# Patient Record
Sex: FEMALE | Race: WHITE
[De-identification: names, ages, dates, MRNs, and addresses within clinical notes are randomized per-mention and may not be internally consistent; named-entity substitution may affect disease eponyms.]

---

## 2017-10-26 ENCOUNTER — HOSPITAL ENCOUNTER (EMERGENCY)
Dept: HOSPITAL 1 - ED | Age: 22
Discharge: HOME | End: 2017-10-26
Payer: COMMERCIAL

## 2017-10-26 VITALS — BODY MASS INDEX: 32.14 KG/M2 | WEIGHT: 181.37 LBS | HEIGHT: 63 IN

## 2017-10-26 VITALS — SYSTOLIC BLOOD PRESSURE: 121 MMHG | DIASTOLIC BLOOD PRESSURE: 62 MMHG

## 2017-10-26 DIAGNOSIS — J20.9: Primary | ICD-10-CM

## 2017-10-27 ENCOUNTER — HOSPITAL ENCOUNTER (EMERGENCY)
Dept: HOSPITAL 1 - ED | Age: 22
Discharge: HOME | End: 2017-10-27
Payer: COMMERCIAL

## 2017-10-27 VITALS — SYSTOLIC BLOOD PRESSURE: 116 MMHG | DIASTOLIC BLOOD PRESSURE: 78 MMHG

## 2017-10-27 DIAGNOSIS — O21.0: Primary | ICD-10-CM

## 2017-10-27 DIAGNOSIS — Z3A.01: ICD-10-CM

## 2017-10-27 LAB
ALBUMIN SERPL-MCNC: 4.3 G/DL (ref 3.4–5)
ALP SERPL-CCNC: 84 U/L (ref 46–116)
ALT SERPL-CCNC: 21 U/L (ref 14–59)
AST SERPL-CCNC: 11 U/L (ref 15–37)
BASOPHILS NFR BLD: 0.7 % (ref 0–2)
BILIRUB SERPL-MCNC: 0.6 MG/DL (ref 0.2–1)
BUN SERPL-MCNC: 12 MG/DL (ref 7–18)
CALCIUM SERPL-MCNC: 9.3 MG/DL (ref 8.5–10.1)
CHLORIDE SERPL-SCNC: 103 MMOL/L (ref 98–107)
CO2 SERPL-SCNC: 23.6 MMOL/L (ref 21–32)
CREAT SERPL-MCNC: 0.8 MG/DL (ref 0.6–1)
ERYTHROCYTE [DISTWIDTH] IN BLOOD BY AUTOMATED COUNT: 13.4 % (ref 11.5–14.5)
GFR SERPLBLD BASED ON 1.73 SQ M-ARVRAT: > 60 ML/MIN
GLUCOSE SERPL-MCNC: 101 MG/DL (ref 74–106)
PLATELET # BLD: 264 X10^3MCL (ref 130–400)
POTASSIUM SERPL-SCNC: 3.2 MMOL/L (ref 3.5–5.1)
PROT SERPL-MCNC: 8.5 G/DL (ref 6.4–8.2)
SODIUM SERPL-SCNC: 139 MMOL/L (ref 136–145)

## 2017-10-30 ENCOUNTER — HOSPITAL ENCOUNTER (INPATIENT)
Dept: HOSPITAL 1 - ED | Age: 22
LOS: 3 days | Discharge: HOME | DRG: 781 | End: 2017-11-02
Attending: FAMILY MEDICINE | Admitting: FAMILY MEDICINE
Payer: COMMERCIAL

## 2017-10-30 VITALS — SYSTOLIC BLOOD PRESSURE: 112 MMHG | DIASTOLIC BLOOD PRESSURE: 63 MMHG

## 2017-10-30 VITALS
HEIGHT: 63 IN | BODY MASS INDEX: 31.28 KG/M2 | WEIGHT: 176.55 LBS | BODY MASS INDEX: 31.28 KG/M2 | HEIGHT: 63 IN | WEIGHT: 176.55 LBS

## 2017-10-30 VITALS — DIASTOLIC BLOOD PRESSURE: 71 MMHG | SYSTOLIC BLOOD PRESSURE: 119 MMHG

## 2017-10-30 DIAGNOSIS — E87.6: ICD-10-CM

## 2017-10-30 DIAGNOSIS — O99.281: ICD-10-CM

## 2017-10-30 DIAGNOSIS — Z3A.01: ICD-10-CM

## 2017-10-30 DIAGNOSIS — N17.0: ICD-10-CM

## 2017-10-30 DIAGNOSIS — F12.10: ICD-10-CM

## 2017-10-30 DIAGNOSIS — E86.0: ICD-10-CM

## 2017-10-30 DIAGNOSIS — O21.1: Primary | ICD-10-CM

## 2017-10-30 DIAGNOSIS — O99.211: ICD-10-CM

## 2017-10-30 DIAGNOSIS — E66.9: ICD-10-CM

## 2017-10-30 DIAGNOSIS — O26.831: ICD-10-CM

## 2017-10-30 LAB
ALBUMIN SERPL-MCNC: 4.5 G/DL (ref 3.4–5)
ALP SERPL-CCNC: 79 U/L (ref 46–116)
ALT SERPL-CCNC: 36 U/L (ref 14–59)
AMPHETAMINES UR QL SCN: (no result)
AMYLASE SERPL-CCNC: 81 U/L (ref 25–115)
AST SERPL-CCNC: 24 U/L (ref 15–37)
BASOPHILS NFR BLD: 1.3 % (ref 0–2)
BILIRUB SERPL-MCNC: 1.11 MG/DL (ref 0.2–1)
BUN SERPL-MCNC: 12 MG/DL (ref 7–18)
CALCIUM SERPL-MCNC: 9.6 MG/DL (ref 8.5–10.1)
CHLORIDE SERPL-SCNC: 98 MMOL/L (ref 98–107)
CHOLEST SERPL-MCNC: 177 MG/DL (ref ?–200)
CHOLEST/HDLC SERPL: 4.1 MG/DL
CO2 SERPL-SCNC: 25.4 MMOL/L (ref 21–32)
CREAT SERPL-MCNC: 0.8 MG/DL (ref 0.6–1)
ERYTHROCYTE [DISTWIDTH] IN BLOOD BY AUTOMATED COUNT: 13.3 % (ref 11.5–14.5)
GFR SERPLBLD BASED ON 1.73 SQ M-ARVRAT: > 60 ML/MIN
GLUCOSE SERPL-MCNC: 112 MG/DL (ref 74–106)
HDLC SERPL-MCNC: 43 MG/DL (ref 40–60)
LIPASE SERPL-CCNC: 152 IU/L (ref 73–393)
MAGNESIUM SERPL-MCNC: 2.3 MG/DL (ref 1.8–2.4)
MICROSCOPIC UR-IMP: YES
PHOSPHATE SERPL-MCNC: 3.4 MG/DL (ref 2.5–4.9)
PLATELET # BLD: 295 X10^3MCL (ref 130–400)
POTASSIUM SERPL-SCNC: 3.1 MMOL/L (ref 3.5–5.1)
PROT SERPL-MCNC: 8.9 G/DL (ref 6.4–8.2)
RBC # UR STRIP.AUTO: NEGATIVE /UL
SODIUM SERPL-SCNC: 136 MMOL/L (ref 136–145)
T3 SERPL-MCNC: 1.15 NG/ML
T3RU NFR SERPL: 32 % UPTAKE (ref 30–39)
T4 FREE SERPL-MCNC: 1.31 NG/DL (ref 0.76–1.46)
T4 SERPL-MCNC: 13.3 UG/DL (ref 4.7–13.3)
T4/T3 UPTAKE INDEX SERPL: 4.3 UG/DL (ref 1.4–4.5)
TRIGL SERPL-MCNC: 91 MG/DL (ref ?–150)
UA SPECIFIC GRAVITY: 1.02 (ref 1–1.03)

## 2017-10-31 VITALS — SYSTOLIC BLOOD PRESSURE: 114 MMHG | DIASTOLIC BLOOD PRESSURE: 79 MMHG

## 2017-10-31 VITALS — SYSTOLIC BLOOD PRESSURE: 104 MMHG | DIASTOLIC BLOOD PRESSURE: 68 MMHG

## 2017-10-31 VITALS — SYSTOLIC BLOOD PRESSURE: 119 MMHG | DIASTOLIC BLOOD PRESSURE: 73 MMHG

## 2017-10-31 VITALS — SYSTOLIC BLOOD PRESSURE: 103 MMHG | DIASTOLIC BLOOD PRESSURE: 58 MMHG

## 2017-10-31 VITALS — SYSTOLIC BLOOD PRESSURE: 109 MMHG | DIASTOLIC BLOOD PRESSURE: 41 MMHG

## 2017-10-31 LAB
BASOPHILS NFR BLD: 0.8 % (ref 0–2)
BUN SERPL-MCNC: 6 MG/DL (ref 7–18)
CALCIUM SERPL-MCNC: 8.5 MG/DL (ref 8.5–10.1)
CHLORIDE SERPL-SCNC: 103 MMOL/L (ref 98–107)
CO2 SERPL-SCNC: 26.4 MMOL/L (ref 21–32)
CREAT SERPL-MCNC: 0.6 MG/DL (ref 0.6–1)
ERYTHROCYTE [DISTWIDTH] IN BLOOD BY AUTOMATED COUNT: 13 % (ref 11.5–14.5)
GFR SERPLBLD BASED ON 1.73 SQ M-ARVRAT: > 60 ML/MIN
GLUCOSE SERPL-MCNC: 91 MG/DL (ref 74–106)
MAGNESIUM SERPL-MCNC: 2.2 MG/DL (ref 1.8–2.4)
PHOSPHATE SERPL-MCNC: 3.2 MG/DL (ref 2.5–4.9)
PLATELET # BLD: 234 X10^3MCL (ref 130–400)
POTASSIUM SERPL-SCNC: 3 MMOL/L (ref 3.5–5.1)
SODIUM SERPL-SCNC: 138 MMOL/L (ref 136–145)

## 2017-11-01 VITALS — SYSTOLIC BLOOD PRESSURE: 128 MMHG | DIASTOLIC BLOOD PRESSURE: 75 MMHG

## 2017-11-01 VITALS — DIASTOLIC BLOOD PRESSURE: 75 MMHG | SYSTOLIC BLOOD PRESSURE: 127 MMHG

## 2017-11-01 VITALS — SYSTOLIC BLOOD PRESSURE: 123 MMHG | DIASTOLIC BLOOD PRESSURE: 81 MMHG

## 2017-11-01 VITALS — SYSTOLIC BLOOD PRESSURE: 126 MMHG | DIASTOLIC BLOOD PRESSURE: 80 MMHG

## 2017-11-01 VITALS — SYSTOLIC BLOOD PRESSURE: 131 MMHG | DIASTOLIC BLOOD PRESSURE: 80 MMHG

## 2017-11-01 VITALS — SYSTOLIC BLOOD PRESSURE: 124 MMHG | DIASTOLIC BLOOD PRESSURE: 65 MMHG

## 2017-11-01 LAB
BASOPHILS NFR BLD: 0.3 % (ref 0–2)
BUN SERPL-MCNC: 3 MG/DL (ref 7–18)
CALCIUM SERPL-MCNC: 8.9 MG/DL (ref 8.5–10.1)
CHLORIDE SERPL-SCNC: 102 MMOL/L (ref 98–107)
CO2 SERPL-SCNC: 23.3 MMOL/L (ref 21–32)
CREAT SERPL-MCNC: 0.6 MG/DL (ref 0.6–1)
ERYTHROCYTE [DISTWIDTH] IN BLOOD BY AUTOMATED COUNT: 13.4 % (ref 11.5–14.5)
GFR SERPLBLD BASED ON 1.73 SQ M-ARVRAT: > 60 ML/MIN
GLUCOSE SERPL-MCNC: 99 MG/DL (ref 74–106)
MAGNESIUM SERPL-MCNC: 1.9 MG/DL (ref 1.8–2.4)
PHOSPHATE SERPL-MCNC: 3.2 MG/DL (ref 2.5–4.9)
PLATELET # BLD: 234 X10^3MCL (ref 130–400)
POTASSIUM SERPL-SCNC: 3.6 MMOL/L (ref 3.5–5.1)
SODIUM SERPL-SCNC: 137 MMOL/L (ref 136–145)

## 2017-11-02 VITALS — SYSTOLIC BLOOD PRESSURE: 115 MMHG | DIASTOLIC BLOOD PRESSURE: 66 MMHG

## 2017-11-02 VITALS — DIASTOLIC BLOOD PRESSURE: 76 MMHG | SYSTOLIC BLOOD PRESSURE: 115 MMHG

## 2017-11-02 VITALS — DIASTOLIC BLOOD PRESSURE: 66 MMHG | SYSTOLIC BLOOD PRESSURE: 115 MMHG

## 2017-11-02 LAB
BASOPHILS NFR BLD: 0.7 % (ref 0–2)
BUN SERPL-MCNC: 6 MG/DL (ref 7–18)
CALCIUM SERPL-MCNC: 8.8 MG/DL (ref 8.5–10.1)
CHLORIDE SERPL-SCNC: 101 MMOL/L (ref 98–107)
CO2 SERPL-SCNC: 25.9 MMOL/L (ref 21–32)
CREAT SERPL-MCNC: 0.7 MG/DL (ref 0.6–1)
ERYTHROCYTE [DISTWIDTH] IN BLOOD BY AUTOMATED COUNT: 13 % (ref 11.5–14.5)
GFR SERPLBLD BASED ON 1.73 SQ M-ARVRAT: > 60 ML/MIN
GLUCOSE SERPL-MCNC: 100 MG/DL (ref 74–106)
MAGNESIUM SERPL-MCNC: 2.1 MG/DL (ref 1.8–2.4)
PHOSPHATE SERPL-MCNC: 3.6 MG/DL (ref 2.5–4.9)
PLATELET # BLD: 233 X10^3MCL (ref 130–400)
POTASSIUM SERPL-SCNC: 3.2 MMOL/L (ref 3.5–5.1)
SODIUM SERPL-SCNC: 136 MMOL/L (ref 136–145)

## 2017-11-18 ENCOUNTER — HOSPITAL ENCOUNTER (INPATIENT)
Dept: HOSPITAL 26 - MED | Age: 22
LOS: 3 days | Discharge: HOME | DRG: 781 | End: 2017-11-21
Attending: FAMILY MEDICINE | Admitting: FAMILY MEDICINE
Payer: COMMERCIAL

## 2017-11-18 VITALS — SYSTOLIC BLOOD PRESSURE: 106 MMHG | DIASTOLIC BLOOD PRESSURE: 56 MMHG

## 2017-11-18 VITALS — BODY MASS INDEX: 27.16 KG/M2 | WEIGHT: 169 LBS | HEIGHT: 66 IN

## 2017-11-18 DIAGNOSIS — O26.832: ICD-10-CM

## 2017-11-18 DIAGNOSIS — O99.612: ICD-10-CM

## 2017-11-18 DIAGNOSIS — R79.89: ICD-10-CM

## 2017-11-18 DIAGNOSIS — K81.0: ICD-10-CM

## 2017-11-18 DIAGNOSIS — N17.0: ICD-10-CM

## 2017-11-18 DIAGNOSIS — Z3A.09: ICD-10-CM

## 2017-11-18 DIAGNOSIS — E86.0: ICD-10-CM

## 2017-11-18 DIAGNOSIS — R74.0: ICD-10-CM

## 2017-11-18 DIAGNOSIS — O26.892: ICD-10-CM

## 2017-11-18 DIAGNOSIS — O21.1: Primary | ICD-10-CM

## 2017-11-18 DIAGNOSIS — Z79.899: ICD-10-CM

## 2017-11-18 NOTE — NUR
22Y/F PT. PRESENTS TO ED WITH C/O GENERALIZED WEAKNESS X 1 WK. PT. IUP 9 WKS, 
N/V. NO MEDICAL HX, AAO X4, AMBULATORY WITH STEADY GAIT, GCS 15. RESPIRATIONS 
ROOM AIR, EVEN AND UNLABORED. ABDOMEN SOFT, NON TENDER, PT. GENERALIZED 
WEAKNESS. VSS, ER MD MADE AWARE OFPT. STATUS.

## 2017-11-19 VITALS — SYSTOLIC BLOOD PRESSURE: 119 MMHG | DIASTOLIC BLOOD PRESSURE: 55 MMHG

## 2017-11-19 VITALS — SYSTOLIC BLOOD PRESSURE: 133 MMHG | DIASTOLIC BLOOD PRESSURE: 73 MMHG

## 2017-11-19 VITALS — SYSTOLIC BLOOD PRESSURE: 139 MMHG | DIASTOLIC BLOOD PRESSURE: 98 MMHG

## 2017-11-19 VITALS — DIASTOLIC BLOOD PRESSURE: 69 MMHG | SYSTOLIC BLOOD PRESSURE: 124 MMHG

## 2017-11-19 LAB
ALBUMIN FLD-MCNC: 3.4 G/DL (ref 3.4–5)
AMYLASE SERPL-CCNC: 222 U/L (ref 25–115)
ANION GAP SERPL CALCULATED.3IONS-SCNC: 6.9 MMOL/L (ref 8–16)
ANION GAP SERPL CALCULATED.3IONS-SCNC: 7.6 MMOL/L (ref 8–16)
APPEARANCE UR: (no result)
AST SERPL-CCNC: 65 U/L (ref 15–37)
BASOPHILS # BLD AUTO: 0.3 K/UL (ref 0–0.22)
BASOPHILS # BLD AUTO: 0.3 K/UL (ref 0–0.22)
BASOPHILS NFR BLD AUTO: 3.3 % (ref 0–2)
BASOPHILS NFR BLD AUTO: 3.6 % (ref 0–2)
BILIRUB SERPL-MCNC: 1.2 MG/DL (ref 0–1)
BILIRUB UR QL STRIP: NEGATIVE
BUN SERPL-MCNC: 49 MG/DL (ref 7–18)
BUN SERPL-MCNC: 61 MG/DL (ref 7–18)
CHLORIDE SERPL-SCNC: 77 MMOL/L (ref 98–107)
CHLORIDE SERPL-SCNC: 86 MMOL/L (ref 98–107)
CHOLEST/HDLC SERPL: 6.8 {RATIO} (ref 1–4.5)
CO2 SERPL-SCNC: 39.2 MMOL/L (ref 21–32)
CO2 SERPL-SCNC: 42.5 MMOL/L (ref 21–32)
COLOR UR: YELLOW
CREAT SERPL-MCNC: 1.6 MG/DL (ref 0.6–1.3)
CREAT SERPL-MCNC: 1.9 MG/DL (ref 0.6–1.3)
EOSINOPHIL # BLD AUTO: 0.1 K/UL (ref 0–0.4)
EOSINOPHIL # BLD AUTO: 0.1 K/UL (ref 0–0.4)
EOSINOPHIL NFR BLD AUTO: 0.6 % (ref 0–4)
EOSINOPHIL NFR BLD AUTO: 1.6 % (ref 0–4)
ERYTHROCYTE [DISTWIDTH] IN BLOOD BY AUTOMATED COUNT: 12 % (ref 11.6–13.7)
ERYTHROCYTE [DISTWIDTH] IN BLOOD BY AUTOMATED COUNT: 12.3 % (ref 11.6–13.7)
GFR SERPL CREATININE-BSD FRML MDRD: 43 ML/MIN (ref 90–?)
GFR SERPL CREATININE-BSD FRML MDRD: 52 ML/MIN (ref 90–?)
GLUCOSE SERPL-MCNC: 111 MG/DL (ref 74–106)
GLUCOSE SERPL-MCNC: 117 MG/DL (ref 74–106)
GLUCOSE UR STRIP-MCNC: (no result) MG/DL
HCT VFR BLD AUTO: 34.5 % (ref 36–48)
HCT VFR BLD AUTO: 42 % (ref 36–48)
HDLC SERPL-MCNC: 32 MG/DL (ref 40–60)
HGB BLD-MCNC: 11.4 G/DL (ref 12–16)
HGB BLD-MCNC: 14.1 G/DL (ref 12–16)
HGB UR QL STRIP: NEGATIVE
LDLC SERPL CALC-MCNC: 126 MG/DL (ref 60–100)
LEUKOCYTE ESTERASE UR QL STRIP: (no result)
LIPASE SERPL-CCNC: 395 U/L (ref 73–393)
LYMPHOCYTES # BLD AUTO: 1.9 K/UL (ref 2.5–16.5)
LYMPHOCYTES # BLD AUTO: 2 K/UL (ref 2.5–16.5)
LYMPHOCYTES NFR BLD AUTO: 20.2 % (ref 20.5–51.1)
LYMPHOCYTES NFR BLD AUTO: 26.2 % (ref 20.5–51.1)
MAGNESIUM SERPL-MCNC: 2.9 MG/DL (ref 1.8–2.4)
MCH RBC QN AUTO: 28 PG (ref 27–31)
MCH RBC QN AUTO: 28 PG (ref 27–31)
MCHC RBC AUTO-ENTMCNC: 33 G/DL (ref 33–37)
MCHC RBC AUTO-ENTMCNC: 34 G/DL (ref 33–37)
MCV RBC AUTO: 84 FL (ref 80–94)
MCV RBC AUTO: 85 FL (ref 80–94)
MONOCYTES # BLD AUTO: 0.6 K/UL (ref 0.8–1)
MONOCYTES # BLD AUTO: 1 K/UL (ref 0.8–1)
MONOCYTES NFR BLD AUTO: 13 % (ref 1.7–9.3)
MONOCYTES NFR BLD AUTO: 5.8 % (ref 1.7–9.3)
NEUTROPHILS # BLD AUTO: 4.3 K/UL (ref 1.8–7.7)
NEUTROPHILS # BLD AUTO: 6.6 K/UL (ref 1.8–7.7)
NEUTROPHILS NFR BLD AUTO: 55.6 % (ref 42.2–75.2)
NEUTROPHILS NFR BLD AUTO: 70.1 % (ref 42.2–75.2)
NITRITE UR QL STRIP: NEGATIVE
PH UR STRIP: 7.5 [PH] (ref 5–9)
PHOSPHATE SERPL-MCNC: 3.7 MG/DL (ref 2.5–4.9)
PLATELET # BLD AUTO: 148 K/UL (ref 140–450)
PLATELET # BLD AUTO: 182 K/UL (ref 140–450)
POTASSIUM SERPL-SCNC: 2.1 MMOL/L (ref 3.5–5.1)
POTASSIUM SERPL-SCNC: 3.1 MMOL/L (ref 3.5–5.1)
PROTHROMBIN TIME: 11.1 SECS (ref 10.8–13.4)
RBC # BLD AUTO: 4.07 MIL/UL (ref 4.2–5.4)
RBC # BLD AUTO: 4.99 MIL/UL (ref 4.2–5.4)
RBC #/AREA URNS HPF: (no result) /HPF (ref 0–5)
SODIUM SERPL-SCNC: 125 MMOL/L (ref 136–145)
SODIUM SERPL-SCNC: 129 MMOL/L (ref 136–145)
T4 FREE SERPL-MCNC: 6.6 NG/DL (ref 0.76–1.46)
TRIGL SERPL-MCNC: 306 MG/DL (ref 30–150)
TSH SERPL DL<=0.05 MIU/L-ACNC: 0.01 UIU/ML (ref 0.34–3.74)
WBC # BLD AUTO: 7.7 K/UL (ref 4.8–10.8)
WBC # BLD AUTO: 9.5 K/UL (ref 4.8–10.8)
WBC,URINE: (no result) /HPF (ref 0–5)

## 2017-11-19 RX ADMIN — SODIUM CHLORIDE SCH MLS/HR: 9 INJECTION, SOLUTION INTRAVENOUS at 20:17

## 2017-11-19 RX ADMIN — SODIUM CHLORIDE SCH MLS/HR: 9 INJECTION, SOLUTION INTRAVENOUS at 13:35

## 2017-11-19 RX ADMIN — SODIUM CHLORIDE SCH MLS/HR: 9 INJECTION, SOLUTION INTRAVENOUS at 03:57

## 2017-11-19 NOTE — NUR
RECEIVED REPORT FROM AM RN AT BEDSIDE, PT IS AAOX4, ABLE TO FOLLOW COMMANDS AND MAKE NEEDS 
KNOWN. NO S/S OF DISTRESS, CLEAR LUNG SOUNDS, ON RA. DENIES CHEST PAIN, ST ON TELE MONITOR. 
SOFT ABDOMEN WITH ACTIVE BOWEL SOUNDS, CONTINENT WITH B&B'S, ABLE TO MOVE ALL EXTREMITIES, 
STABLE GAIT. PERIPHERAL LINE TO RIGHT AC 20GA RUNNING NS  ML/HR, PERIPHERAL LINE TO 
LEFT AC 20GA, SL. SKIN IS INTACT, WARM AND DRY TO TOUCH. VSS, DENIES PAIN. SAFETY PRECAUTION 
IN PLACE, WILL CONTINUE TO MONITOR.

## 2017-11-19 NOTE — NUR
11/19/17 RD INITIAL ASSESSMENT COMPLETED

PLEASE REFER TO NUTRITION ASSESSMENT UNDER CARE ACTIVITY FOR ESTIMATED NUTRITIONAL NEEDS. 



RD RECOMMENDATIONS:

1. CONTINUE NPO AS MEDICALLY APPROPRIATE PER MD.

2. PER MD DISCRETION, CONSIDER INITIATING ORAL NUTRITION  CLEAR LIQUID DIET AND ADVANCE AS 
TOLERATED TO REGULAR DIET. 

3. CONSULT RDN PRN. 

4. RD WILL F/U 2-3 DAYS; HIGH RISK. 



EREN DEVINE, MS, RDN

## 2017-11-19 NOTE — NUR
ENDORSED PLAN OF CARE TO DAY RN. PATIENT RESTING IN BED, NO S/S OF DISTRESS NOTED, PATIENT 
IS IN STABLE CONDITION.

## 2017-11-19 NOTE — NUR
ADMITTED PATIENT TO THE TELE UNIT, PATIENT AWAKE ALERT ORIENTED X4, NO S/S OF DISTRESS NOTED 
,RESPIRATION EVEN AND UNLABORED, IV PATENT AND INTACT, TELE MONITOR IN PLACE. ORIENTED 
PATIENT TO THE ROOM. PLAN OF CARE DISCUSSED, PATIENT VERBALIZED UNDERSTANDING. CALL LIGHT 
WITHIN REACH, SAFETY MEASURE ENSURED, WILL CONTINUE TO MONITOR.

## 2017-11-19 NOTE — NUR
PATIENT HAS BEEN SCREENED AND CATEGORIZED AS HIGH NUTRITION RISK. PATIENT WILL BE SEEN 
WITHIN 1-2 DAYS OF ADMISSION.



11/19/17-11/20/17



EREN DEVINE MS, RDN

## 2017-11-19 NOTE — NUR
SECOND BAG OF POTASSIUM CHL 20 MEQ STARTED. PATIENT IS SLEEPING. NO S/S OF DISTRESS NOTED, 
RESPIRATION EVEN AND UNLABORED, CALL LIGHT WITHIN REACH, SAFETY MEASURE ENSURED, WILL 
CONTINUE TO MONITOR.

## 2017-11-19 NOTE — NUR
Patient will be admitted to care of MANDATORIAN. Admited to TELEMETRY.  Will go 
to room 105A. Belongings list completed.  Report to MELITON EDMOND.

## 2017-11-19 NOTE — NUR
RECEIVED REPORT FROM NIGHT SHIFT RN. PATIENT IS AAOX4. NO SIGNS AND SYMPTOMS OF ACUTE 
RESPIRATORY DISTRESS NOTED AT THIS TIME. PATIENT HAS IV D5 NORMAL SALINE INFUSING TO RIGHT 
AC 20G AT 120ML/HR. SITE IS CLEAN, DRY, PATENT AND INTACT. PATIENT HAS KCL INFUSING TO LEFT 
AC 20G AT 30 ML/HR. SITE IS CLEAN, DRY, PATENT AND INTACT. BED IN LOWEST POSITION SIDERAILS 
UP X2, CALL LIGHT PLACED WITHIN REACH. WILL CONTINUE TO MONITOR.

## 2017-11-19 NOTE — NUR
PATIENT WAS SLEEPING, BUT EASY TO AROUSE, NO S/S OF DISTRESS NOTED, RESPIRATION EVEN AND 
UNLABORED, CALL LIGHT WITHIN REACH, SAFETY MEASURE ENSURED, WILL CONTINUE TO MONITOR.

## 2017-11-20 VITALS — DIASTOLIC BLOOD PRESSURE: 62 MMHG | SYSTOLIC BLOOD PRESSURE: 117 MMHG

## 2017-11-20 VITALS — SYSTOLIC BLOOD PRESSURE: 111 MMHG | DIASTOLIC BLOOD PRESSURE: 56 MMHG

## 2017-11-20 VITALS — SYSTOLIC BLOOD PRESSURE: 120 MMHG | DIASTOLIC BLOOD PRESSURE: 62 MMHG

## 2017-11-20 VITALS — DIASTOLIC BLOOD PRESSURE: 78 MMHG | SYSTOLIC BLOOD PRESSURE: 138 MMHG

## 2017-11-20 VITALS — DIASTOLIC BLOOD PRESSURE: 61 MMHG | SYSTOLIC BLOOD PRESSURE: 125 MMHG

## 2017-11-20 VITALS — DIASTOLIC BLOOD PRESSURE: 69 MMHG | SYSTOLIC BLOOD PRESSURE: 120 MMHG

## 2017-11-20 LAB
ANION GAP SERPL CALCULATED.3IONS-SCNC: 5.2 MMOL/L (ref 8–16)
BASOPHILS # BLD AUTO: 0.2 K/UL (ref 0–0.22)
BASOPHILS NFR BLD AUTO: 3.1 % (ref 0–2)
BUN SERPL-MCNC: 25 MG/DL (ref 7–18)
CHLORIDE SERPL-SCNC: 95 MMOL/L (ref 98–107)
CO2 SERPL-SCNC: 36.6 MMOL/L (ref 21–32)
CREAT SERPL-MCNC: 1.1 MG/DL (ref 0.6–1.3)
EOSINOPHIL # BLD AUTO: 0.2 K/UL (ref 0–0.4)
EOSINOPHIL NFR BLD AUTO: 2.8 % (ref 0–4)
ERYTHROCYTE [DISTWIDTH] IN BLOOD BY AUTOMATED COUNT: 12.1 % (ref 11.6–13.7)
GFR SERPL CREATININE-BSD FRML MDRD: 81 ML/MIN (ref 90–?)
GLUCOSE SERPL-MCNC: 89 MG/DL (ref 74–106)
HCT VFR BLD AUTO: 30.8 % (ref 36–48)
HGB BLD-MCNC: 10.1 G/DL (ref 12–16)
LYMPHOCYTES # BLD AUTO: 1.5 K/UL (ref 2.5–16.5)
LYMPHOCYTES NFR BLD AUTO: 24.1 % (ref 20.5–51.1)
MCH RBC QN AUTO: 28 PG (ref 27–31)
MCHC RBC AUTO-ENTMCNC: 33 G/DL (ref 33–37)
MCV RBC AUTO: 85 FL (ref 80–94)
MONOCYTES # BLD AUTO: 0.7 K/UL (ref 0.8–1)
MONOCYTES NFR BLD AUTO: 10.6 % (ref 1.7–9.3)
NEUTROPHILS # BLD AUTO: 3.7 K/UL (ref 1.8–7.7)
NEUTROPHILS NFR BLD AUTO: 59.4 % (ref 42.2–75.2)
PLATELET # BLD AUTO: 120 K/UL (ref 140–450)
POTASSIUM SERPL-SCNC: 2.8 MMOL/L (ref 3.5–5.1)
RBC # BLD AUTO: 3.62 MIL/UL (ref 4.2–5.4)
SODIUM SERPL-SCNC: 134 MMOL/L (ref 136–145)
WBC # BLD AUTO: 6.3 K/UL (ref 4.8–10.8)

## 2017-11-20 RX ADMIN — PYRIDOXINE HCL TAB 50 MG SCH MG: 50 TAB at 13:00

## 2017-11-20 RX ADMIN — SODIUM CHLORIDE PRN MG: 9 INJECTION, SOLUTION INTRAVENOUS at 17:05

## 2017-11-20 RX ADMIN — SODIUM CHLORIDE SCH MLS/HR: 9 INJECTION, SOLUTION INTRAVENOUS at 22:21

## 2017-11-20 RX ADMIN — SODIUM CHLORIDE PRN MG: 9 INJECTION, SOLUTION INTRAVENOUS at 08:03

## 2017-11-20 RX ADMIN — SODIUM CHLORIDE SCH MLS/HR: 9 INJECTION, SOLUTION INTRAVENOUS at 14:27

## 2017-11-20 RX ADMIN — SODIUM CHLORIDE SCH MLS/HR: 9 INJECTION, SOLUTION INTRAVENOUS at 05:59

## 2017-11-20 RX ADMIN — PYRIDOXINE HCL TAB 50 MG SCH MG: 50 TAB at 17:00

## 2017-11-20 RX ADMIN — SODIUM CHLORIDE PRN MG: 9 INJECTION, SOLUTION INTRAVENOUS at 11:44

## 2017-11-20 NOTE — NUR
PATIENT REPORT RECEIVED FROM MORNING NURSE AT BEDSIDE. PATIENT IS AWAKE, ALERT, AND 
ORIENTED. NO SIGNS AND SYMPTOMS OF DISTRESS NOTED. NO COMPLAINTS OF PAIN AT THIS TIME. 
FAMILY MEMBER PRESENT AT BEDSIDE. BED IN FOWLERS POSITION, SIDE RAILS UP AND CALL LIGHT 
WITHIN REACH. WILL CONTINUE TO MONITOR.

## 2017-11-20 NOTE — NUR
PATIENT VOMITING, WILL ADMINISTER IV ZOFRAN. NO SIGNS AND SYMPTOMS OF DISTRESS NOTED AT THIS 
TIME. WILL CONTINUE TO MONITOR.

## 2017-11-20 NOTE — NUR
CM NOTE

AS PER  JOSEMANUEL, NO  ASSIGNED YET BUT TO SEND REVIEWS TO [facility] FAX# [phone] # [phone] OPT[facility].

INITIAL REVIEW FAXED [phone] # [phone] [facility] 1.

## 2017-11-21 VITALS — DIASTOLIC BLOOD PRESSURE: 57 MMHG | SYSTOLIC BLOOD PRESSURE: 114 MMHG

## 2017-11-21 VITALS — SYSTOLIC BLOOD PRESSURE: 117 MMHG | DIASTOLIC BLOOD PRESSURE: 59 MMHG

## 2017-11-21 VITALS — DIASTOLIC BLOOD PRESSURE: 47 MMHG | SYSTOLIC BLOOD PRESSURE: 110 MMHG

## 2017-11-21 VITALS — DIASTOLIC BLOOD PRESSURE: 54 MMHG | SYSTOLIC BLOOD PRESSURE: 119 MMHG

## 2017-11-21 VITALS — DIASTOLIC BLOOD PRESSURE: 60 MMHG | SYSTOLIC BLOOD PRESSURE: 117 MMHG

## 2017-11-21 LAB
ANION GAP SERPL CALCULATED.3IONS-SCNC: 7.9 MMOL/L (ref 8–16)
ANION GAP SERPL CALCULATED.3IONS-SCNC: 8.3 MMOL/L (ref 8–16)
BASOPHILS # BLD AUTO: 0 K/UL (ref 0–0.22)
BASOPHILS NFR BLD AUTO: 0.7 % (ref 0–2)
BUN SERPL-MCNC: 14 MG/DL (ref 7–18)
BUN SERPL-MCNC: 14 MG/DL (ref 7–18)
CHLORIDE SERPL-SCNC: 101 MMOL/L (ref 98–107)
CHLORIDE SERPL-SCNC: 101 MMOL/L (ref 98–107)
CO2 SERPL-SCNC: 29.9 MMOL/L (ref 21–32)
CO2 SERPL-SCNC: 30 MMOL/L (ref 21–32)
CREAT SERPL-MCNC: 1.1 MG/DL (ref 0.6–1.3)
CREAT SERPL-MCNC: 1.2 MG/DL (ref 0.6–1.3)
EOSINOPHIL # BLD AUTO: 0.2 K/UL (ref 0–0.4)
EOSINOPHIL NFR BLD AUTO: 3.3 % (ref 0–4)
ERYTHROCYTE [DISTWIDTH] IN BLOOD BY AUTOMATED COUNT: 13 % (ref 11.6–13.7)
GFR SERPL CREATININE-BSD FRML MDRD: 73 ML/MIN (ref 90–?)
GFR SERPL CREATININE-BSD FRML MDRD: 81 ML/MIN (ref 90–?)
GLUCOSE SERPL-MCNC: 94 MG/DL (ref 74–106)
GLUCOSE SERPL-MCNC: 94 MG/DL (ref 74–106)
HCT VFR BLD AUTO: 27.7 % (ref 36–48)
HGB BLD-MCNC: 9.3 G/DL (ref 12–16)
LYMPHOCYTES # BLD AUTO: 1.3 K/UL (ref 2.5–16.5)
LYMPHOCYTES NFR BLD AUTO: 20.7 % (ref 20.5–51.1)
MCH RBC QN AUTO: 29 PG (ref 27–31)
MCHC RBC AUTO-ENTMCNC: 34 G/DL (ref 33–37)
MCV RBC AUTO: 85 FL (ref 80–94)
MONOCYTES # BLD AUTO: 0.5 K/UL (ref 0.8–1)
MONOCYTES NFR BLD AUTO: 8.4 % (ref 1.7–9.3)
NEUTROPHILS # BLD AUTO: 4.3 K/UL (ref 1.8–7.7)
NEUTROPHILS NFR BLD AUTO: 66.9 % (ref 42.2–75.2)
PLATELET # BLD AUTO: 114 K/UL (ref 140–450)
POTASSIUM SERPL-SCNC: 2.9 MMOL/L (ref 3.5–5.1)
POTASSIUM SERPL-SCNC: 4.2 MMOL/L (ref 3.5–5.1)
RBC # BLD AUTO: 3.26 MIL/UL (ref 4.2–5.4)
SODIUM SERPL-SCNC: 135 MMOL/L (ref 136–145)
SODIUM SERPL-SCNC: 136 MMOL/L (ref 136–145)
WBC # BLD AUTO: 6.4 K/UL (ref 4.8–10.8)

## 2017-11-21 RX ADMIN — PYRIDOXINE HCL TAB 50 MG SCH MG: 50 TAB at 09:01

## 2017-11-21 RX ADMIN — SODIUM CHLORIDE SCH MLS/HR: 9 INJECTION, SOLUTION INTRAVENOUS at 15:27

## 2017-11-21 RX ADMIN — SODIUM CHLORIDE SCH MLS/HR: 9 INJECTION, SOLUTION INTRAVENOUS at 07:02

## 2017-11-21 RX ADMIN — PYRIDOXINE HCL TAB 50 MG SCH MG: 50 TAB at 17:37

## 2017-11-21 RX ADMIN — PYRIDOXINE HCL TAB 50 MG SCH MG: 50 TAB at 12:12

## 2017-11-21 NOTE — NUR
0840  RECEIVED CALL FROM ANGEL AT [facility] STATING THAT CASE WAS BEING DENIED DUE TO 
LACK OF CLINICAL INFORMATION AND DURING CONVERSATION SHE DID INDICATE THAT SHE HAD RECEIVED 
THE CLINICALS AND WOULD REVIEW.  GAVE REFERENCE NUMBER [id number] AND STATED THAT THE 
ASSIGNED MEDICAL MANAGEMENT NURSE IS ALICIA FAIR [phone].

## 2017-11-21 NOTE — NUR
RECEIVED PATIENT REPORT AT BEDSIDE. PATIENT ASLEEP BUT EASILY AROUSABLE. NO S/S OF DISTRESS 
NOTED. NO C/O PAIN. NO ACTIVE VOMITING AT THIS TIME. IV LINE NOTED ON THE RIGHT HAND WITH 
IVF INFUSING WELL. PATIENT ON TELE MONITORING. BED LOWERED WITH CALL LIGHT WITHIN REACH. 
WILL CONTINUE TO MONITOR

## 2017-11-21 NOTE — NUR
CHECKED ON PATIENT. PATIENT IS ASLEEP, NO SIGNS AND SYMPTOMS OF DISTRESS NOTED. BREATHING 
EVEN AND UNLABORED. BED IN LOWEST POSITION, SIDE RAILS UP AND CALL LIGHT WITHIN REACH. WILL 
CONTINUE TO MONITOR.

## 2017-11-21 NOTE — NUR
PATIENT DISCHARGED TO HOME. DISCHARGE INSTRUCTIONS GIVEN. PATIENT VERBALIZED UNDERSTANDING. 
IV LINE DISCONTINUED. TELE LEADS TAKEN OFF. PATIENT LEFT WITH ALL HER BELONGINGS AND 
DISCHARGE PAPERS. PATIENT LEFT IN STABLE CONDITION

## 2017-11-24 LAB
T3RU NFR SERPL: 53 % (ref 24–39)
T4 SERPL-MCNC: >25 UG/DL (ref 4.5–12)

## 2017-12-09 ENCOUNTER — HOSPITAL ENCOUNTER (INPATIENT)
Dept: HOSPITAL 26 - MED | Age: 22
LOS: 2 days | Discharge: HOME | DRG: 781 | End: 2017-12-11
Payer: COMMERCIAL

## 2017-12-09 VITALS — WEIGHT: 138 LBS | BODY MASS INDEX: 23.56 KG/M2 | HEIGHT: 64 IN

## 2017-12-09 VITALS — DIASTOLIC BLOOD PRESSURE: 59 MMHG | SYSTOLIC BLOOD PRESSURE: 107 MMHG

## 2017-12-09 VITALS — SYSTOLIC BLOOD PRESSURE: 121 MMHG | DIASTOLIC BLOOD PRESSURE: 56 MMHG

## 2017-12-09 VITALS — DIASTOLIC BLOOD PRESSURE: 53 MMHG | SYSTOLIC BLOOD PRESSURE: 99 MMHG

## 2017-12-09 VITALS — DIASTOLIC BLOOD PRESSURE: 66 MMHG | SYSTOLIC BLOOD PRESSURE: 111 MMHG

## 2017-12-09 DIAGNOSIS — Z91.14: ICD-10-CM

## 2017-12-09 DIAGNOSIS — E03.9: ICD-10-CM

## 2017-12-09 DIAGNOSIS — O99.351: ICD-10-CM

## 2017-12-09 DIAGNOSIS — K80.20: ICD-10-CM

## 2017-12-09 DIAGNOSIS — E78.5: ICD-10-CM

## 2017-12-09 DIAGNOSIS — N17.0: ICD-10-CM

## 2017-12-09 DIAGNOSIS — K85.90: ICD-10-CM

## 2017-12-09 DIAGNOSIS — O21.1: Primary | ICD-10-CM

## 2017-12-09 DIAGNOSIS — Z3A.12: ICD-10-CM

## 2017-12-09 DIAGNOSIS — O99.611: ICD-10-CM

## 2017-12-09 DIAGNOSIS — O99.011: ICD-10-CM

## 2017-12-09 DIAGNOSIS — D64.9: ICD-10-CM

## 2017-12-09 DIAGNOSIS — O26.891: ICD-10-CM

## 2017-12-09 DIAGNOSIS — O99.281: ICD-10-CM

## 2017-12-09 DIAGNOSIS — O25.11: ICD-10-CM

## 2017-12-09 LAB
ALBUMIN FLD-MCNC: 3 G/DL (ref 3.4–5)
ALBUMIN FLD-MCNC: 3.9 G/DL (ref 3.4–5)
AMYLASE SERPL-CCNC: 264 U/L (ref 25–115)
ANION GAP SERPL CALCULATED.3IONS-SCNC: 11.1 MMOL/L (ref 8–16)
ANION GAP SERPL CALCULATED.3IONS-SCNC: 7.1 MMOL/L (ref 8–16)
AST SERPL-CCNC: 57 U/L (ref 15–37)
AST SERPL-CCNC: 58 U/L (ref 15–37)
BASOPHILS # BLD AUTO: 0.1 K/UL (ref 0–0.22)
BASOPHILS NFR BLD AUTO: 1.3 % (ref 0–2)
BILIRUB SERPL-MCNC: 1 MG/DL (ref 0–1)
BILIRUB SERPL-MCNC: 1.8 MG/DL (ref 0–1)
BUN SERPL-MCNC: 53 MG/DL (ref 7–18)
BUN SERPL-MCNC: 56 MG/DL (ref 7–18)
CHLORIDE SERPL-SCNC: 75 MMOL/L (ref 98–107)
CHLORIDE SERPL-SCNC: 83 MMOL/L (ref 98–107)
CHOLEST/HDLC SERPL: 9.4 {RATIO} (ref 1–4.5)
CO2 SERPL-SCNC: 40 MMOL/L (ref 21–32)
CO2 SERPL-SCNC: 43 MMOL/L (ref 21–32)
CREAT SERPL-MCNC: 2.7 MG/DL (ref 0.6–1.3)
CREAT SERPL-MCNC: 3.6 MG/DL (ref 0.6–1.3)
EOSINOPHIL # BLD AUTO: 0 K/UL (ref 0–0.4)
EOSINOPHIL NFR BLD AUTO: 0.4 % (ref 0–4)
ERYTHROCYTE [DISTWIDTH] IN BLOOD BY AUTOMATED COUNT: 12.1 % (ref 11.6–13.7)
GFR SERPL CREATININE-BSD FRML MDRD: 21 ML/MIN (ref 90–?)
GFR SERPL CREATININE-BSD FRML MDRD: 29 ML/MIN (ref 90–?)
GLUCOSE SERPL-MCNC: 117 MG/DL (ref 74–106)
GLUCOSE SERPL-MCNC: 121 MG/DL (ref 74–106)
HCT VFR BLD AUTO: 42.2 % (ref 36–48)
HDLC SERPL-MCNC: 36 MG/DL (ref 40–60)
HGB BLD-MCNC: 13.8 G/DL (ref 12–16)
LDLC SERPL CALC-MCNC: 173 MG/DL (ref 60–100)
LIPASE SERPL-CCNC: 401 U/L (ref 73–393)
LYMPHOCYTES # BLD AUTO: 1.7 K/UL (ref 2.5–16.5)
LYMPHOCYTES NFR BLD AUTO: 16.1 % (ref 20.5–51.1)
MAGNESIUM SERPL-MCNC: 2.4 MG/DL (ref 1.8–2.4)
MAGNESIUM SERPL-MCNC: 3 MG/DL (ref 1.8–2.4)
MCH RBC QN AUTO: 27 PG (ref 27–31)
MCHC RBC AUTO-ENTMCNC: 33 G/DL (ref 33–37)
MCV RBC AUTO: 83 FL (ref 80–94)
MONOCYTES # BLD AUTO: 1 K/UL (ref 0.8–1)
MONOCYTES NFR BLD AUTO: 9.6 % (ref 1.7–9.3)
NEUTROPHILS # BLD AUTO: 7.7 K/UL (ref 1.8–7.7)
NEUTROPHILS NFR BLD AUTO: 72.6 % (ref 42.2–75.2)
PHOSPHATE SERPL-MCNC: 5.1 MG/DL (ref 2.5–4.9)
PHOSPHATE SERPL-MCNC: 6.9 MG/DL (ref 2.5–4.9)
PLATELET # BLD AUTO: 351 K/UL (ref 140–450)
POTASSIUM SERPL-SCNC: 2.1 MMOL/L (ref 3.5–5.1)
POTASSIUM SERPL-SCNC: 2.1 MMOL/L (ref 3.5–5.1)
PROTHROMBIN TIME: 14.3 SECS (ref 10.8–13.4)
RBC # BLD AUTO: 5.09 MIL/UL (ref 4.2–5.4)
SODIUM SERPL-SCNC: 127 MMOL/L (ref 136–145)
SODIUM SERPL-SCNC: 128 MMOL/L (ref 136–145)
T4 FREE SERPL-MCNC: 3.99 NG/DL (ref 0.76–1.46)
TRIGL SERPL-MCNC: 643 MG/DL (ref 30–150)
TSH SERPL DL<=0.05 MIU/L-ACNC: < 0.01 UIU/ML (ref 0.34–3.74)
WBC # BLD AUTO: 10.5 K/UL (ref 4.8–10.8)

## 2017-12-09 RX ADMIN — SODIUM CHLORIDE PRN MG: 9 INJECTION, SOLUTION INTRAVENOUS at 20:19

## 2017-12-09 RX ADMIN — SODIUM CHLORIDE SCH MLS/HR: 9 INJECTION, SOLUTION INTRAVENOUS at 14:10

## 2017-12-09 RX ADMIN — DOCUSATE SODIUM SCH MG: 100 CAPSULE, LIQUID FILLED ORAL at 20:30

## 2017-12-09 RX ADMIN — ACETAMINOPHEN PRN MG: 325 TABLET ORAL at 20:30

## 2017-12-09 NOTE — NUR
PATIENT WAS COMPLAINING OF FEELING NAUSEATED AND A TELEPHONE ORDER WAS OBTAINED EARLIER FOR 
ZOFRAN  IV 4MG Q4HRS PRN FROM JONATHAN MENDOZA AND HE WAS ALSO INFORMED HE HAS A CONSULT FOR THE 
PATIENT. PATIENT WAS MEDICATED WITH ZOFRAN AS ORDERED BY MELITON SCHMID. WILL CONTINUE TO MONITOR.

## 2017-12-09 NOTE — NUR
PATIENT IS CURRENTLY RECEIVING FIRST BAG 20MEQ OF POTASSIUM K-RIDER IV AND IS INFUSING WELL 
PATIENT IS TOLERATING WELL NO COMPLAINS OF PAIN OR DISCOMFORT PATIENT CONTINUES TO SLEEP 
WELL AND NO NAUSEA OR VOMITING NOTED AT THIS TIME.

## 2017-12-09 NOTE — NUR
PATIENT WAS MEDICATED WITH TYLENOL FOR TEMP 100.0 HASN'T DECREASED AFTER COOLING 
MEASURES.WILL CONTINUE TO MONITOR.

## 2017-12-09 NOTE — NUR
RECEIVED PT FROM ER. PT IS AWAKE. ALERT, AND ORIENTEDX4. NO S/S ACUTE DISTRESS NOTED. SKIN 
IS INTACT. 12 WEEKS PREGNANT. LOWER ABD PAIN 3/10, TOLERABLE AT THIS TIME. PT VOMITED EARLY 
THIS MORNING. DENIES ANY NAUSEA AND VOMITING AT THIS TIME. AMBULATORY. PT HAD SYNCOPE AT 
HOME, FALL AND SAFETY PRECAUTIONS IN PLACE. CALL LIGHT WITHIN REACH. MRSA DONE. VITAL SIGNS 
TAKEN. WILL CONTINUE TO MONITOR.

## 2017-12-09 NOTE — NUR
Patient will be admitted to care of DR SOUSA. Admited to TELE.  Will go to 
[id number]. Belongings list completed.  Report to MELITON ROD.

## 2017-12-09 NOTE — NUR
PATIENT BIBA DUE TO SYNCOPAL EPISODE AT HOME. PER EMS SYNCOPAL WITNESSBY A 
FAMILY MEMBER;DENIES HITTING HER HEAD;PT STATES SHE TOOK MARIJUANA FOR HER 
NAUSEA;PT IS 12 WEEKS PREGNANT;.SKIN IS PINK/WARM/DRY; AAOX4 WITH EVEN AND 
STEADY GAIT; LUNGS CLEAR BL; HR EVEN AND REGULAR; PT DENIES ANY FEVER, CP, SOB, 
OR COUGH AT THIS TIME; PATIENT STATES PAIN OF 0/10 AT THIS TIME;PATIENT 
POSITIONED FOR COMFORT; HOB ELEVATED; BEDRAILS UP X2; BED DOWN. ALL MONITORS IN 
PLACED;ER MD MADE AWARE OF PT STATUS.

## 2017-12-09 NOTE — NUR
PATIENT COMPLAINS OF PAIN TO IV SITE RT AC G#20 PATIENT WANTS ME TO DISCONTINUE HER IV LINE 
AND RESTART A NEW ONE. NEW IV LINE TO RT HAND G#22 STARTED AT FIRST ATTEMPT WITH GOOD BLOOD 
RETURN AND IVF INFUSING WELL.PATIENT TOLERATED PROCEDURE WELL.WILL CONTINUE TO MONITOR.CALL 
LIGHT WITHIN REACH.

## 2017-12-09 NOTE — NUR
PATIENT IS CURRENTLY AWAKE ALERT RESTING IN BED AT THIS TIME NO COMPLAINS OF PAIN OR NAUSEA 
OR VOMITING AT THIS TIME NO COMPLAINS OF FEELING DIZZY.IVF INFUSING WELL IV SITE PATENT FALL 
AND SAFETY PRECAUTIONS IMPLEMENTED PATIENT ASKED TO CALL FOR ASSISTANCE IF SHE NEEDS 
ASSISTANCE OR FEELS DIZZY GOING TO THE BATHROOM PATIENT VERBALIZES UNDERSTANDING.CALL LIGHT 
WITHIN REACH WILL CONTINUE TO MONITOR.

## 2017-12-09 NOTE — NUR
ENDORSED PATIENT TO THE NIGHT SHIFT RN. PT IS IN STABLE CONDITION. DENIES PAIN AND VOMITING 
AT THIS TIME.

## 2017-12-09 NOTE — NUR
PATIENT ASSISTED TO THE BATHROOM AND BACK TO BED PATIENT REMINDED TO GET UP SLOWLY AND TO 
CALL FOR ASSISTANCE.PATIENT WAS ASSISTED TO THE BATHROOM AND BACK TO BED PATIENT DID 
WELL.CALL LIGHT WITHIN REACH.

## 2017-12-10 VITALS — SYSTOLIC BLOOD PRESSURE: 134 MMHG | DIASTOLIC BLOOD PRESSURE: 79 MMHG

## 2017-12-10 VITALS — SYSTOLIC BLOOD PRESSURE: 118 MMHG | DIASTOLIC BLOOD PRESSURE: 65 MMHG

## 2017-12-10 VITALS — SYSTOLIC BLOOD PRESSURE: 119 MMHG | DIASTOLIC BLOOD PRESSURE: 67 MMHG

## 2017-12-10 VITALS — SYSTOLIC BLOOD PRESSURE: 108 MMHG | DIASTOLIC BLOOD PRESSURE: 70 MMHG

## 2017-12-10 VITALS — DIASTOLIC BLOOD PRESSURE: 65 MMHG | SYSTOLIC BLOOD PRESSURE: 123 MMHG

## 2017-12-10 VITALS — DIASTOLIC BLOOD PRESSURE: 56 MMHG | SYSTOLIC BLOOD PRESSURE: 115 MMHG

## 2017-12-10 LAB
ANION GAP SERPL CALCULATED.3IONS-SCNC: 7.3 MMOL/L (ref 8–16)
ANION GAP SERPL CALCULATED.3IONS-SCNC: 8.7 MMOL/L (ref 8–16)
ANION GAP SERPL CALCULATED.3IONS-SCNC: 8.7 MMOL/L (ref 8–16)
BASOPHILS # BLD AUTO: 0.1 K/UL (ref 0–0.22)
BASOPHILS NFR BLD AUTO: 1.4 % (ref 0–2)
BUN SERPL-MCNC: 43 MG/DL (ref 7–18)
BUN SERPL-MCNC: 45 MG/DL (ref 7–18)
BUN SERPL-MCNC: 51 MG/DL (ref 7–18)
CHLORIDE SERPL-SCNC: 91 MMOL/L (ref 98–107)
CHLORIDE SERPL-SCNC: 94 MMOL/L (ref 98–107)
CHLORIDE SERPL-SCNC: 95 MMOL/L (ref 98–107)
CO2 SERPL-SCNC: 35.5 MMOL/L (ref 21–32)
CO2 SERPL-SCNC: 37.2 MMOL/L (ref 21–32)
CO2 SERPL-SCNC: 39.4 MMOL/L (ref 21–32)
CREAT SERPL-MCNC: 1.9 MG/DL (ref 0.6–1.3)
CREAT SERPL-MCNC: 2 MG/DL (ref 0.6–1.3)
CREAT SERPL-MCNC: 2.3 MG/DL (ref 0.6–1.3)
EOSINOPHIL # BLD AUTO: 0.2 K/UL (ref 0–0.4)
EOSINOPHIL NFR BLD AUTO: 2.9 % (ref 0–4)
ERYTHROCYTE [DISTWIDTH] IN BLOOD BY AUTOMATED COUNT: 12.1 % (ref 11.6–13.7)
GFR SERPL CREATININE-BSD FRML MDRD: 34 ML/MIN (ref 90–?)
GFR SERPL CREATININE-BSD FRML MDRD: 40 ML/MIN (ref 90–?)
GFR SERPL CREATININE-BSD FRML MDRD: 43 ML/MIN (ref 90–?)
GLUCOSE SERPL-MCNC: 101 MG/DL (ref 74–106)
GLUCOSE SERPL-MCNC: 95 MG/DL (ref 74–106)
GLUCOSE SERPL-MCNC: 99 MG/DL (ref 74–106)
HCT VFR BLD AUTO: 32.9 % (ref 36–48)
HGB BLD-MCNC: 11 G/DL (ref 12–16)
LYMPHOCYTES # BLD AUTO: 2.6 K/UL (ref 2.5–16.5)
LYMPHOCYTES NFR BLD AUTO: 33.1 % (ref 20.5–51.1)
MAGNESIUM SERPL-MCNC: 2.4 MG/DL (ref 1.8–2.4)
MCH RBC QN AUTO: 28 PG (ref 27–31)
MCHC RBC AUTO-ENTMCNC: 33 G/DL (ref 33–37)
MCV RBC AUTO: 84 FL (ref 80–94)
MONOCYTES # BLD AUTO: 0.9 K/UL (ref 0.8–1)
MONOCYTES NFR BLD AUTO: 10.7 % (ref 1.7–9.3)
NEUTROPHILS # BLD AUTO: 4.2 K/UL (ref 1.8–7.7)
NEUTROPHILS NFR BLD AUTO: 51.9 % (ref 42.2–75.2)
PHOSPHATE SERPL-MCNC: 3.7 MG/DL (ref 2.5–4.9)
PLATELET # BLD AUTO: 229 K/UL (ref 140–450)
POTASSIUM SERPL-SCNC: 2.7 MMOL/L (ref 3.5–5.1)
POTASSIUM SERPL-SCNC: 2.9 MMOL/L (ref 3.5–5.1)
POTASSIUM SERPL-SCNC: 3.2 MMOL/L (ref 3.5–5.1)
RBC # BLD AUTO: 3.94 MIL/UL (ref 4.2–5.4)
SODIUM SERPL-SCNC: 134 MMOL/L (ref 136–145)
SODIUM SERPL-SCNC: 135 MMOL/L (ref 136–145)
SODIUM SERPL-SCNC: 139 MMOL/L (ref 136–145)
WBC # BLD AUTO: 8 K/UL (ref 4.8–10.8)

## 2017-12-10 RX ADMIN — SODIUM CHLORIDE SCH MLS/HR: 9 INJECTION, SOLUTION INTRAVENOUS at 02:59

## 2017-12-10 RX ADMIN — FERROUS SULFATE TAB 325 MG (65 MG ELEMENTAL FE) SCH MG: 325 (65 FE) TAB at 08:29

## 2017-12-10 RX ADMIN — DOCUSATE SODIUM SCH MG: 100 CAPSULE, LIQUID FILLED ORAL at 20:07

## 2017-12-10 RX ADMIN — OXYCODONE HYDROCHLORIDE AND ACETAMINOPHEN SCH MG: 500 TABLET ORAL at 08:28

## 2017-12-10 RX ADMIN — ACETAMINOPHEN PRN MG: 325 TABLET ORAL at 20:08

## 2017-12-10 RX ADMIN — SODIUM CHLORIDE PRN MG: 9 INJECTION, SOLUTION INTRAVENOUS at 08:31

## 2017-12-10 RX ADMIN — SODIUM CHLORIDE SCH MLS/HR: 9 INJECTION, SOLUTION INTRAVENOUS at 06:19

## 2017-12-10 RX ADMIN — Medication SCH TAB: at 08:28

## 2017-12-10 RX ADMIN — SODIUM CHLORIDE SCH MLS/HR: 9 INJECTION, SOLUTION INTRAVENOUS at 20:57

## 2017-12-10 RX ADMIN — DOCUSATE SODIUM SCH MG: 100 CAPSULE, LIQUID FILLED ORAL at 08:28

## 2017-12-10 NOTE — NUR
MD BURNETT MADE ROUND THIS MORNING UPDATED ON PATIENT'S CONDITION DURING THE NIGHT AND SHE 
ENTERED NEW ORDERS,ORDERS WILL BE CARRIED OUT.PATIENT DIET WAS ADVANCED TO [facility] AND PATIENT 
WAS GIVEN SOME CRACKERS PER MD'S REQUEST.WILL CONTINUE TO MONITOR.

## 2017-12-10 NOTE — NUR
PATIENT IS CURRENTLY SLEEPING IN NO DISTRESS WILL CONTINUE TO MONITOR.CALL LIGHT WITHIN 
REACH.NO N/V NOTED.

## 2017-12-10 NOTE — NUR
MD RESIDENT AYSHA AWARE THAT PATIENT'S POTASSIUM IS 3.2 EARLIER AND MD GAVE ORDERS.ORDERS 
CARRIED OUT.PATIENT WAS GIVEN SOME JUICE SO SHE CAN TAKE HER MEDS.

## 2017-12-10 NOTE — NUR
PT RESTING IN BED. NO S/S OF ACUTE DISTRESS. PT DENIES PAIN. CALL LIGHT WITHIN REACH. WILL 
CONTINUE TO MONITOR.

## 2017-12-10 NOTE — NUR
TUCKER APPLIED TO NECK AS ORDERED BY MD RESIDENT OLMSTEAD FOR PATIENT'S NECK PAIN.SHE ALSO 
CAME TO SEE AND SPOKE TO THE PATIENT.

## 2017-12-10 NOTE — NUR
I CALLED [facility] AND ASKED WHY THE MEDICATION ORDERED POTASSIUM IS TAKING LONG 
TIME TO BE VERIFIED PHARMACY TECH CHECKED MEDICATION WAS CHECKED BY HER AND APPARENTLY IT 
WASN'T SEEN SO IT WASN'T VERIFIED SO THEY WILL VERIFY THE MED SO I CAN GIVE IT.

## 2017-12-10 NOTE — NUR
PATIENT IS CURRENTLY AWAKE ALERT ORIENTED RESTING IN BED DENIES FEELING NAUSEATED AT THIS 
TIME. MOTHER BROUGHT HER FOOD A HAMBURGER AND FRIES AND PATIENT ATE MOST OF THE FOOD AND 
TOLERATING IT WELL AND ALSO DRINKING WATER.PATIENT CONTINUES TO ENCOURAGE TO CALL FOR 
ASSISTANCE.PATIENT VERBALIZES UNDERSTANDING AND IS ABLE TO MAKE NEEDS KNOWN.WILL CONTINUE TO 
MONITOR PATIENT IS CURRENTLY WITH MOTHER TALKING.

## 2017-12-10 NOTE — NUR
RECD. RESTING IN BED, AWAKE, A/OX4. RESPIRATION EVEN AND UNLABORED. IV OF NS  ML/HR 
INFUSING, RIGHT HAND G 22. STATED STILL OCCASIONAL FEELING OF NAUSEA, ABLE TO EAT 25% OF 
MEAL TRAY. PLAN OF CARE FOR THE SHIFT DISCUSSED. SAFETY MEASURES ENFORCED. CALL LIGHT 
INREAVERBALIZED UNDERSTANDING. DENIES PAIN 0/10.

-------------------------------------------------------------------------------

Addendum: 12/10/17 at 1942 by Dania Soriano LVN

-------------------------------------------------------------------------------

CORRECTION OF ENTRY: CALL LIGHT IN REACH. PLAN OF CARE FOR THE SHIFT DISCUSSED.

## 2017-12-10 NOTE — NUR
PATIENT IS CURRENTLY RESTING IN BED IVF INFUSING WELL HASN'T HAD ANY EPISODE OF NAUSEA 
CONTINUES TO TAKE SIPS OF WATER AND IS TOLERATING WELL AT THIS TIME.

## 2017-12-10 NOTE — NUR
PATIENT WAS WOKEN UP FOR VITAL SIGNS AND WOKE UP IN PAIN PATIENT STATES,"MY IV HURTS A LOT I 
CAN'T STAND THE MEDICATION ITS BURNING PLEASE STOP IT." SO I STOPPED THE FIRST BAG OF 20MEQ 
POTASSIUM K-RIDER IV THAT WAS INFUSING I INFORMED MELITON COOMBS.I NOTIFIED MD RESIDENT 
AYSHA THAT PATIENT REFUSED THE 20MEQ KRIDER POTASSIUM IV BECAUSE SHE CANNOT STAND THE 
PAIN FROM THE MEDICATION INFUSING AND IS ALSO INFORMED HER THAT I ASKED THE PATIENT IF SHE 
WOULD RATHER TAKE TABLETS AGAIN AND PATIENT TOLD ME THAT SHE WOULD PREFER TO TAKE TABLETS 
INSTEAD I TOLD RESIDENT THAT EARLIER PATIENT TOOK 40MEQ OF KDUR AND SHE TOLERATED IT WELL 
SHE DIDN'T VOMIT.MD SAID SHE WILL PUT ORDERS.

## 2017-12-10 NOTE — NUR
CONVERSING WITH VISITOR AT THE BEDSIDE. COMPLAINING OF SORENESS BEHIND THE NECK. WILL 
INFORMED RESIDENT ON DUTY, WILL REQUEST TO EXAMINE PATIENT'S NECK.

## 2017-12-10 NOTE — NUR
RECEIVED REPORT FROM MELITON BOWENS. PT RESTING IN BED. AAOX4. NO S/S OF ACUTE DISTRESS. PT DENIES 
PAIN. IV SITE PATENT AND INTACT. PT STATES SHE FEEL NAUSEOUS. MEDICATED WITH ZOFRAN. CALL 
LIGHT WITHIN REACH. SAFETY MEASURES ENSURED. WILL CONTINUE TO MONITOR.

## 2017-12-11 VITALS — SYSTOLIC BLOOD PRESSURE: 131 MMHG | DIASTOLIC BLOOD PRESSURE: 74 MMHG

## 2017-12-11 VITALS — SYSTOLIC BLOOD PRESSURE: 128 MMHG | DIASTOLIC BLOOD PRESSURE: 66 MMHG

## 2017-12-11 VITALS — DIASTOLIC BLOOD PRESSURE: 59 MMHG | SYSTOLIC BLOOD PRESSURE: 119 MMHG

## 2017-12-11 LAB
ANION GAP SERPL CALCULATED.3IONS-SCNC: 6.8 MMOL/L (ref 8–16)
BASOPHILS # BLD AUTO: 0.1 K/UL (ref 0–0.22)
BASOPHILS NFR BLD AUTO: 1 % (ref 0–2)
BUN SERPL-MCNC: 28 MG/DL (ref 7–18)
CHLORIDE SERPL-SCNC: 99 MMOL/L (ref 98–107)
CO2 SERPL-SCNC: 35.2 MMOL/L (ref 21–32)
CREAT SERPL-MCNC: 1.2 MG/DL (ref 0.6–1.3)
EOSINOPHIL # BLD AUTO: 0.3 K/UL (ref 0–0.4)
EOSINOPHIL NFR BLD AUTO: 3.7 % (ref 0–4)
ERYTHROCYTE [DISTWIDTH] IN BLOOD BY AUTOMATED COUNT: 12.3 % (ref 11.6–13.7)
GFR SERPL CREATININE-BSD FRML MDRD: 73 ML/MIN (ref 90–?)
GLUCOSE SERPL-MCNC: 98 MG/DL (ref 74–106)
HCT VFR BLD AUTO: 28 % (ref 36–48)
HGB BLD-MCNC: 9.1 G/DL (ref 12–16)
IRON SERPL-MCNC: 141 UG/DL (ref 35–150)
LYMPHOCYTES # BLD AUTO: 2.4 K/UL (ref 2.5–16.5)
LYMPHOCYTES NFR BLD AUTO: 29.9 % (ref 20.5–51.1)
MAGNESIUM SERPL-MCNC: 1.6 MG/DL (ref 1.8–2.4)
MCH RBC QN AUTO: 28 PG (ref 27–31)
MCHC RBC AUTO-ENTMCNC: 33 G/DL (ref 33–37)
MCV RBC AUTO: 85 FL (ref 80–94)
MONOCYTES # BLD AUTO: 0.7 K/UL (ref 0.8–1)
MONOCYTES NFR BLD AUTO: 8.6 % (ref 1.7–9.3)
NEUTROPHILS # BLD AUTO: 4.4 K/UL (ref 1.8–7.7)
NEUTROPHILS NFR BLD AUTO: 56.8 % (ref 42.2–75.2)
PHOSPHATE SERPL-MCNC: 1.1 MG/DL (ref 2.5–4.9)
PLATELET # BLD AUTO: 186 K/UL (ref 140–450)
POTASSIUM SERPL-SCNC: 3 MMOL/L (ref 3.5–5.1)
RBC # BLD AUTO: 3.3 MIL/UL (ref 4.2–5.4)
SODIUM SERPL-SCNC: 138 MMOL/L (ref 136–145)
TIBC SERPL-MCNC: 201 UG/DL (ref 250–450)
WBC # BLD AUTO: 7.9 K/UL (ref 4.8–10.8)

## 2017-12-11 RX ADMIN — FERROUS SULFATE TAB 325 MG (65 MG ELEMENTAL FE) SCH MG: 325 (65 FE) TAB at 08:32

## 2017-12-11 RX ADMIN — SODIUM CHLORIDE PRN MG: 9 INJECTION, SOLUTION INTRAVENOUS at 17:24

## 2017-12-11 RX ADMIN — OXYCODONE HYDROCHLORIDE AND ACETAMINOPHEN SCH MG: 500 TABLET ORAL at 08:32

## 2017-12-11 RX ADMIN — SODIUM CHLORIDE PRN MG: 9 INJECTION, SOLUTION INTRAVENOUS at 06:55

## 2017-12-11 RX ADMIN — SODIUM CHLORIDE SCH MLS/HR: 9 INJECTION, SOLUTION INTRAVENOUS at 05:24

## 2017-12-11 RX ADMIN — Medication SCH TAB: at 08:32

## 2017-12-11 RX ADMIN — DOCUSATE SODIUM SCH MG: 100 CAPSULE, LIQUID FILLED ORAL at 08:32

## 2017-12-11 NOTE — NUR
PATIENT IV KEEPS BEEPING BECAUSE  PATIENT KEEPS BENDING HER ARM PATIENT CURRENTLY USING HER 
PHONE AND MOVING ABOUT IN BED.IV WAS FLUSHED WITH NORMAL SALINE AND ITS CURRENTLY PATENT 
THERE IS BLOOD RETURN BUT PATIENT IS ASKING ME IF SHE STILL NEEDS IVF THEN PATIENT 
STATES,"I'VE BEEN DRINKING WATER AND HAVEN'T FELT NAUSEATED." SO I TOLD THE PATIENT I WILL 
ASK MD NEW.

## 2017-12-11 NOTE — NUR
PATIENT HAD REQUESTED FOR POPSICLES AND RN HOUSE SUPERVISOR BRITANY INFORMED AND SHE BROUGHT 
SOME POPSICLES FOR THE PATIENT PATIENT CONTINUES TO BE WELL NO COMPLAINS OF N/V NOTED OR 
PAIN.PATIENT STATES,"THE K-PAD TO HER NECK IS WORKING FINE."WILL CONTINUE TO MONITOR.

## 2017-12-11 NOTE — NUR
D/C HOME VIA WHEELCHAIR ACCOMPANIED BY PT. . AWAKE, ALERT, AND ORIENTED X4. SPEECH 
CLEAR. NO C/O PAIN. NO C/O N/V. NO SOB, NOTED. IN STABLE CONDITION. INFORMED CHARGE NURSE 
QUIN LANCASTER.

## 2017-12-11 NOTE — NUR
PATIENT DOING WELL IS CURRENTLY AWAKE PLAYING WITH HER PHONE.SADIE PAIN AND DENIES N/V.CALL 
LIGHT WITHIN REACH WILL CONTINUE TO MONITOR.

## 2017-12-11 NOTE — NUR
PATIENT SLEEPING IN BED AT THIS TIME REPORT ENDORSED TO LVN RAMITERRE HE WILL RESUME CARE OF 
THE PATIENT.

## 2017-12-11 NOTE — NUR
MD NEW CALLED AND ASKED THAT PATIENT WAS ASKING IF ITS STILL NECESSARY FOR HER TO STILL 
HAVE IVFLUIDS BECAUSE SHE HAS BEEN EATING AND DRINKING FINE.I INFORMED MD NEW AND SHE 
GAVE NEW ORDERS.

## 2017-12-11 NOTE — NUR
CM NOTE

INITIAL REVIEW FAXED TO [facility]  / FAX# [phone], C: [phone], OPT. 
1

## 2017-12-11 NOTE — NUR
EXPLAINED TO PT. ABOUT MD D/C ORDER, D/C INSTRUCTIONS AND TEACHING, EDUCATED ABOUT MD D/C 
PRESCRIPTION THAT WAS ARRANGED BY MD PEREZ PT. PREFERRED PHARMACY, DR. JONATAN GARCIA FOLLOW UP 
APPOINTMENT ON 12/13/2017 AT 9:10 AM, DIAGNOSIS, DIET, PAIN MANAGEMENT TEACHING. VERBALIZED 
UNDERSTANDING. ALSO EXPLAINED ABOUT FLU VACCINE, PT. STATES "I ALREADY HAD FLU VACCINE THIS 
CURRENT FLU SEASON AND I DON'T NEED ANOTHER ONE." ASKED PT. FOR ANY CONCERN OR QUESTION. NO 
QUESTION OR CONCERN RECEIVED FROM PT..

## 2017-12-12 LAB
FERRITIN SERPL-MCNC: 305 NG/ML (ref 15–150)
FOLATE SERPL-MCNC: 5.3 NG/ML (ref 3–?)
TRANSFERRIN SERPL-MCNC: 181 MG/DL (ref 200–370)
VIT B12 SERPL-MCNC: 630 PG/ML (ref 232–1245)

## 2017-12-26 ENCOUNTER — HOSPITAL ENCOUNTER (INPATIENT)
Dept: HOSPITAL 26 - MED | Age: 22
LOS: 3 days | Discharge: HOME | DRG: 781 | End: 2017-12-29
Attending: FAMILY MEDICINE | Admitting: FAMILY MEDICINE
Payer: COMMERCIAL

## 2017-12-26 VITALS — DIASTOLIC BLOOD PRESSURE: 66 MMHG | SYSTOLIC BLOOD PRESSURE: 113 MMHG

## 2017-12-26 VITALS — HEIGHT: 64 IN | BODY MASS INDEX: 25.78 KG/M2 | WEIGHT: 151 LBS

## 2017-12-26 VITALS — DIASTOLIC BLOOD PRESSURE: 78 MMHG | SYSTOLIC BLOOD PRESSURE: 144 MMHG

## 2017-12-26 DIAGNOSIS — O98.812: Primary | ICD-10-CM

## 2017-12-26 DIAGNOSIS — F12.188: ICD-10-CM

## 2017-12-26 DIAGNOSIS — Z91.14: ICD-10-CM

## 2017-12-26 DIAGNOSIS — O16.2: ICD-10-CM

## 2017-12-26 DIAGNOSIS — E87.6: ICD-10-CM

## 2017-12-26 DIAGNOSIS — O99.89: ICD-10-CM

## 2017-12-26 DIAGNOSIS — Z3A.14: ICD-10-CM

## 2017-12-26 DIAGNOSIS — O26.892: ICD-10-CM

## 2017-12-26 DIAGNOSIS — O99.012: ICD-10-CM

## 2017-12-26 DIAGNOSIS — A41.9: ICD-10-CM

## 2017-12-26 DIAGNOSIS — E44.0: ICD-10-CM

## 2017-12-26 DIAGNOSIS — O99.282: ICD-10-CM

## 2017-12-26 DIAGNOSIS — R65.10: ICD-10-CM

## 2017-12-26 DIAGNOSIS — D64.9: ICD-10-CM

## 2017-12-26 DIAGNOSIS — O23.42: ICD-10-CM

## 2017-12-26 DIAGNOSIS — E78.5: ICD-10-CM

## 2017-12-26 DIAGNOSIS — O21.0: ICD-10-CM

## 2017-12-26 LAB
ALBUMIN FLD-MCNC: 3.1 G/DL (ref 3.4–5)
ANION GAP SERPL CALCULATED.3IONS-SCNC: 16.7 MMOL/L (ref 8–16)
AST SERPL-CCNC: 10 U/L (ref 15–37)
BASOPHILS # BLD AUTO: 0 K/UL (ref 0–0.22)
BASOPHILS NFR BLD AUTO: 0.4 % (ref 0–2)
BILIRUB SERPL-MCNC: 0.5 MG/DL (ref 0–1)
BUN SERPL-MCNC: 13 MG/DL (ref 7–18)
CHLORIDE SERPL-SCNC: 100 MMOL/L (ref 98–107)
CO2 SERPL-SCNC: 24.8 MMOL/L (ref 21–32)
CREAT SERPL-MCNC: 0.8 MG/DL (ref 0.6–1.3)
EOSINOPHIL # BLD AUTO: 0 K/UL (ref 0–0.4)
EOSINOPHIL NFR BLD AUTO: 0.3 % (ref 0–4)
ERYTHROCYTE [DISTWIDTH] IN BLOOD BY AUTOMATED COUNT: 14.3 % (ref 11.6–13.7)
GFR SERPL CREATININE-BSD FRML MDRD: 116 ML/MIN (ref 90–?)
GLUCOSE SERPL-MCNC: 104 MG/DL (ref 74–106)
HCT VFR BLD AUTO: 28.7 % (ref 36–48)
HGB BLD-MCNC: 9.7 G/DL (ref 12–16)
LIPASE SERPL-CCNC: 106 U/L (ref 73–393)
LYMPHOCYTES # BLD AUTO: 0.8 K/UL (ref 2.5–16.5)
LYMPHOCYTES NFR BLD AUTO: 6.2 % (ref 20.5–51.1)
MAGNESIUM SERPL-MCNC: 1.7 MG/DL (ref 1.8–2.4)
MCH RBC QN AUTO: 29 PG (ref 27–31)
MCHC RBC AUTO-ENTMCNC: 34 G/DL (ref 33–37)
MCV RBC AUTO: 85 FL (ref 80–94)
MONOCYTES # BLD AUTO: 0.3 K/UL (ref 0.8–1)
MONOCYTES NFR BLD AUTO: 2.5 % (ref 1.7–9.3)
NEUTROPHILS # BLD AUTO: 11.2 K/UL (ref 1.8–7.7)
NEUTROPHILS NFR BLD AUTO: 90.6 % (ref 42.2–75.2)
PHOSPHATE SERPL-MCNC: 3.5 MG/DL (ref 2.5–4.9)
PLATELET # BLD AUTO: 261 K/UL (ref 140–450)
POTASSIUM SERPL-SCNC: 2.5 MMOL/L (ref 3.5–5.1)
PROTHROMBIN TIME: 11.2 SECS (ref 10.8–13.4)
RBC # BLD AUTO: 3.38 MIL/UL (ref 4.2–5.4)
SODIUM SERPL-SCNC: 139 MMOL/L (ref 136–145)
WBC # BLD AUTO: 12.3 K/UL (ref 4.8–10.8)

## 2017-12-26 RX ADMIN — SODIUM CHLORIDE SCH MLS/HR: 9 INJECTION, SOLUTION INTRAVENOUS at 22:36

## 2017-12-26 RX ADMIN — POTASSIUM CHLORIDE ONE MEQ: 750 TABLET, FILM COATED, EXTENDED RELEASE ORAL at 22:48

## 2017-12-26 RX ADMIN — POTASSIUM CHLORIDE ONE MEQ: 750 TABLET, FILM COATED, EXTENDED RELEASE ORAL at 22:21

## 2017-12-26 NOTE — NUR
Patient will be admitted to care of DR LEVINE. Admited to TELEMETRY.  Will 
go to room 111B. Belongings list completed.  Report to MELITON RODRIGUES AT BEDSIDE.

## 2017-12-26 NOTE — NUR
PT ARRIVED AT UNIT VIA GURNEY, PT STABLE, NO DISTRESS NOTED, RECEIVED BEDSIDE REPORT FROM ER 
NURSE PATRICIA COELHO, IV TO R AC 20 G SL, PATENT, SKIN INTACT, PT AAOX4, REPORTED HAVING NO PAIN, 
INITIAL ASSESSMENT DONE, ALL SAFETY PRECAUTION MET, WILL CONTINUE TO MONITOR.

## 2017-12-26 NOTE — NUR
PT REFUSED MEDICATION POTASSIUM, STATED THAT SHE IS FEELING NAUSEOUS AND DOES NOT WANT TO 
TAKE IT. CONFIRMED WITH DR. MASCORRO. PT STABLE, SLEEPING, NO DISTRESS NOTED, CALL LIGHT 
WITHIN REACH, WILL CONTINUE TO MONITOR.

## 2017-12-26 NOTE — NUR
23 Y/O F 2 1/2 MTHS PREGNANT W/C/O UPPER ABD PAIN, N/V X 2 DAYS. PT DENIES ANY 
MED HX. RYAN PANDEY MADE AWARE.

## 2017-12-27 VITALS — SYSTOLIC BLOOD PRESSURE: 120 MMHG | DIASTOLIC BLOOD PRESSURE: 78 MMHG

## 2017-12-27 VITALS — DIASTOLIC BLOOD PRESSURE: 67 MMHG | SYSTOLIC BLOOD PRESSURE: 131 MMHG

## 2017-12-27 VITALS — SYSTOLIC BLOOD PRESSURE: 119 MMHG | DIASTOLIC BLOOD PRESSURE: 67 MMHG

## 2017-12-27 VITALS — DIASTOLIC BLOOD PRESSURE: 52 MMHG | SYSTOLIC BLOOD PRESSURE: 110 MMHG

## 2017-12-27 VITALS — DIASTOLIC BLOOD PRESSURE: 75 MMHG | SYSTOLIC BLOOD PRESSURE: 132 MMHG

## 2017-12-27 VITALS — SYSTOLIC BLOOD PRESSURE: 109 MMHG | DIASTOLIC BLOOD PRESSURE: 66 MMHG

## 2017-12-27 LAB
ANION GAP SERPL CALCULATED.3IONS-SCNC: 15.1 MMOL/L (ref 8–16)
APPEARANCE UR: (no result)
BARBITURATES UR QL SCN: (no result) NG/ML
BASOPHILS # BLD AUTO: 0 K/UL (ref 0–0.22)
BASOPHILS NFR BLD AUTO: 0.4 % (ref 0–2)
BENZODIAZ UR QL SCN: (no result) NG/ML
BILIRUB UR QL STRIP: (no result)
BUN SERPL-MCNC: 10 MG/DL (ref 7–18)
BZE UR QL SCN: (no result) NG/ML
CANNABINOIDS UR QL SCN: (no result) NG/ML
CHLORIDE SERPL-SCNC: 103 MMOL/L (ref 98–107)
CO2 SERPL-SCNC: 24.2 MMOL/L (ref 21–32)
COLOR UR: YELLOW
CREAT SERPL-MCNC: 0.6 MG/DL (ref 0.6–1.3)
EOSINOPHIL # BLD AUTO: 0.1 K/UL (ref 0–0.4)
EOSINOPHIL NFR BLD AUTO: 0.7 % (ref 0–4)
ERYTHROCYTE [DISTWIDTH] IN BLOOD BY AUTOMATED COUNT: 14.6 % (ref 11.6–13.7)
GFR SERPL CREATININE-BSD FRML MDRD: 162 ML/MIN (ref 90–?)
GLUCOSE SERPL-MCNC: 96 MG/DL (ref 74–106)
GLUCOSE UR STRIP-MCNC: NEGATIVE MG/DL
HCT VFR BLD AUTO: 26.6 % (ref 36–48)
HGB BLD-MCNC: 9 G/DL (ref 12–16)
HGB UR QL STRIP: NEGATIVE
LEUKOCYTE ESTERASE UR QL STRIP: (no result)
LYMPHOCYTES # BLD AUTO: 0.8 K/UL (ref 2.5–16.5)
LYMPHOCYTES NFR BLD AUTO: 8.5 % (ref 20.5–51.1)
MAGNESIUM SERPL-MCNC: 2.4 MG/DL (ref 1.8–2.4)
MCH RBC QN AUTO: 28 PG (ref 27–31)
MCHC RBC AUTO-ENTMCNC: 34 G/DL (ref 33–37)
MCV RBC AUTO: 83 FL (ref 80–94)
MONOCYTES # BLD AUTO: 0.3 K/UL (ref 0.8–1)
MONOCYTES NFR BLD AUTO: 3 % (ref 1.7–9.3)
NEUTROPHILS # BLD AUTO: 7.9 K/UL (ref 1.8–7.7)
NEUTROPHILS NFR BLD AUTO: 87.4 % (ref 42.2–75.2)
NITRITE UR QL STRIP: NEGATIVE
OPIATES UR QL SCN: (no result) NG/ML
PCP UR QL SCN: (no result) NG/ML
PH UR STRIP: 6.5 [PH] (ref 5–9)
PHOSPHATE SERPL-MCNC: 2.8 MG/DL (ref 2.5–4.9)
PLATELET # BLD AUTO: 237 K/UL (ref 140–450)
POTASSIUM SERPL-SCNC: 3.3 MMOL/L (ref 3.5–5.1)
RBC # BLD AUTO: 3.19 MIL/UL (ref 4.2–5.4)
RBC #/AREA URNS HPF: (no result) /HPF (ref 0–5)
SODIUM SERPL-SCNC: 139 MMOL/L (ref 136–145)
WBC # BLD AUTO: 9.1 K/UL (ref 4.8–10.8)
WBC,URINE: (no result) /HPF (ref 0–5)

## 2017-12-27 RX ADMIN — PYRIDOXINE HCL TAB 50 MG SCH MG: 50 TAB at 09:06

## 2017-12-27 RX ADMIN — SODIUM CHLORIDE PRN MLS/HR: 9 INJECTION, SOLUTION INTRAVENOUS at 05:55

## 2017-12-27 RX ADMIN — PROMETHAZINE HYDROCHLORIDE PRN MG: 25 INJECTION INTRAMUSCULAR; INTRAVENOUS at 02:29

## 2017-12-27 RX ADMIN — SODIUM CHLORIDE SCH MLS/HR: 9 INJECTION, SOLUTION INTRAVENOUS at 21:58

## 2017-12-27 RX ADMIN — PROPYLTHIOURACIL SCH MG: 50 TABLET ORAL at 22:27

## 2017-12-27 RX ADMIN — PROPYLTHIOURACIL SCH MG: 50 TABLET ORAL at 13:00

## 2017-12-27 RX ADMIN — SODIUM CHLORIDE SCH MLS/HR: 9 INJECTION, SOLUTION INTRAVENOUS at 20:53

## 2017-12-27 RX ADMIN — PROPYLTHIOURACIL SCH MG: 50 TABLET ORAL at 06:03

## 2017-12-27 RX ADMIN — Medication SCH DEV: at 16:48

## 2017-12-27 RX ADMIN — DOCUSATE SODIUM SCH MG: 100 CAPSULE, LIQUID FILLED ORAL at 09:04

## 2017-12-27 RX ADMIN — SODIUM CHLORIDE PRN MLS/HR: 9 INJECTION, SOLUTION INTRAVENOUS at 18:31

## 2017-12-27 RX ADMIN — Medication SCH MG: at 22:26

## 2017-12-27 RX ADMIN — Medication SCH DEV: at 11:16

## 2017-12-27 RX ADMIN — Medication SCH MG: at 09:05

## 2017-12-27 RX ADMIN — Medication SCH DEV: at 06:05

## 2017-12-27 RX ADMIN — DOCUSATE SODIUM SCH MG: 100 CAPSULE, LIQUID FILLED ORAL at 22:27

## 2017-12-27 RX ADMIN — PROMETHAZINE HYDROCHLORIDE PRN MG: 25 INJECTION INTRAMUSCULAR; INTRAVENOUS at 22:29

## 2017-12-27 RX ADMIN — Medication SCH DEV: at 20:54

## 2017-12-27 RX ADMIN — PROMETHAZINE HYDROCHLORIDE PRN MG: 25 INJECTION INTRAMUSCULAR; INTRAVENOUS at 15:23

## 2017-12-27 NOTE — NUR
PER JOSEMANUEL .  FROM JULIANO FROM [facility], NO CM ASSIGNED YET BUT FAX REVIEW 
-[phone].   FAXED INITIAL REVIEW.

## 2017-12-27 NOTE — NUR
PT REFUSED MEDICATION, STATED THAT SHE IS REALLY NAUSEATED AND WOULD NOT BE ABLE TO TAKE THE 
MEDICATION. PT STABLE, NO DISTRESS NOTED, CALL LIGHT WITHIN REACH, WILL CONTINUE TO MONITOR.

## 2017-12-27 NOTE — NUR
12/27/17 

RD INITIAL ASSESSMENT COMPLETED



PLEASE REFER TO NUTRITION ASSESSMENT UNDER CARE ACTIVITY FOR ESTIMATED NUTRITIONAL NEEDS. 



1.DIET TO ADVANCE AS TOLERATED FOR IMPROVED ENERGY AND PROTEIN INTAKE 

2.RD TO FOLLOW-UP 2-3 DAYS, HIGH RISK 



JOSE DOMINGUEZ, RD

## 2017-12-27 NOTE — NUR
PATIENT HAS BEEN SCREENED AND CATEGORIZED AS HIGH NUTRITION RISK.  PATIENT WILL BE SEEN 
WITHIN 1-2 DAYS OF ADMISSION.



12/27/17



JOSE DOMINGUEZ RD

## 2017-12-27 NOTE — NUR
PT STILL VOMITING, MEDICATION ADMINISTERED TO PT, PT TOLERATED WELL, NO DISTRESS NOTED, CALL 
LIGHT WITHIN REACH, WILL CONTINUE TO MONITOR.

## 2017-12-27 NOTE — NUR
RECEIVED PT IN STABLE CONDITION FROM AM NURSE. AWAKE,ALERT AND ORIENTED X4. ON TELE MONITOR/ 
AMBULATORY. WITH IVF INFUSING WELL ON THERE RT HAND #22. CLEAR AND PATENT. NO NAUSEA NOTED 
AT THIS TIME. PLAN OF CARE DISCUSSED AND VERBALIZED UNDERSTANDING. CALL LIGHT PLACED WITHIN 
EASY REACH. WILL CONTINUE TO MONITOR.

## 2017-12-27 NOTE — NUR
PT VOMITED, NAUSEA MEDICATION GIVEN, PT TOLERATED WELL, STILL COMPLAINS OF NAUSEA. WILL 
CONTINUE TO MONITOR.

## 2017-12-27 NOTE — NUR
K DUR 20 MEQ TABLETS NOT ADMINISTERED. PATIENT IS NAUSEATED AND VOMITING SO IS REFUSING THE 
MEDICATION. SAFETY PRECAUTIONS IN PLACE. WILL CONTINUE TO MONITOR PATIENT.

## 2017-12-27 NOTE — NUR
PATIENT REFUSED LUNCH, UNABLE TO TOLERATE CLEAR LIQUIDS AT THIS TIME, REFUSED MEDICATIONS. 
DR. AYSHA LOCKHART.

## 2017-12-27 NOTE — NUR
SBAR REPORT GIVEN TO RN ALEKSANDRA AT PT BEDSIDE. PATIENT STATES FEELING BETTER AFTER ZOFRAN 
ADMINISTRATION. NO S/S OF ACUTE DISTRESS, FAMILY AT BEDSIDE.

## 2017-12-27 NOTE — NUR
ENDORSED CARE TO ANDREA RN AT BEDSIDE FOR CONTINUITY OF CARE. PATIENT IS SLEEPING. NO SIGNS OF 
DISTRESS NOTED. PATIENT IS IN STABLE CONDITION.

## 2017-12-27 NOTE — NUR
RECEIVED REPORT FROM NIGHT SHIFT NURSE AT BEDSIDE FOR CONTINUITY OF CARE. UPDATED THE BOARD. 
PATIENT IS SLEEPING. N0 SIGNS OF DISTRESS NOTED. IV ON RIGHT FA 22 G INFUSING NS AT 50ML/HR, 
ASYMPTOMATIC, PATENT AND INTACT. SKIN INTACT. PT ON RA. SAFETY PRECAUTIONS IN PLACE, BED ON 
LOWEST SETTING, CALL LIGHT WITHIN REACH. WILL CONTINUE TO MONITOR PATIENT.

## 2017-12-27 NOTE — NUR
GAVE BEDSIDE REPORT TO DAY SHIFT NURSE ROMI COELHO, ENDORSED PLAN OF CARE, PT STABLE, NO 
DISTRESS NOTED, CALL LIGHT WITHIN REACH.

## 2017-12-28 VITALS — SYSTOLIC BLOOD PRESSURE: 137 MMHG | DIASTOLIC BLOOD PRESSURE: 84 MMHG

## 2017-12-28 VITALS — SYSTOLIC BLOOD PRESSURE: 121 MMHG | DIASTOLIC BLOOD PRESSURE: 73 MMHG

## 2017-12-28 VITALS — SYSTOLIC BLOOD PRESSURE: 102 MMHG | DIASTOLIC BLOOD PRESSURE: 56 MMHG

## 2017-12-28 VITALS — SYSTOLIC BLOOD PRESSURE: 127 MMHG | DIASTOLIC BLOOD PRESSURE: 77 MMHG

## 2017-12-28 LAB
ANION GAP SERPL CALCULATED.3IONS-SCNC: 14.4 MMOL/L (ref 8–16)
ANION GAP SERPL CALCULATED.3IONS-SCNC: 17.3 MMOL/L (ref 8–16)
BASOPHILS # BLD AUTO: 0.1 K/UL (ref 0–0.22)
BASOPHILS NFR BLD AUTO: 0.9 % (ref 0–2)
BUN SERPL-MCNC: 7 MG/DL (ref 7–18)
BUN SERPL-MCNC: 8 MG/DL (ref 7–18)
CHLORIDE SERPL-SCNC: 101 MMOL/L (ref 98–107)
CHLORIDE SERPL-SCNC: 98 MMOL/L (ref 98–107)
CO2 SERPL-SCNC: 22.4 MMOL/L (ref 21–32)
CO2 SERPL-SCNC: 22.5 MMOL/L (ref 21–32)
CREAT SERPL-MCNC: 0.5 MG/DL (ref 0.6–1.3)
CREAT SERPL-MCNC: 0.5 MG/DL (ref 0.6–1.3)
EOSINOPHIL # BLD AUTO: 0 K/UL (ref 0–0.4)
EOSINOPHIL NFR BLD AUTO: 0.3 % (ref 0–4)
ERYTHROCYTE [DISTWIDTH] IN BLOOD BY AUTOMATED COUNT: 14.2 % (ref 11.6–13.7)
GFR SERPL CREATININE-BSD FRML MDRD: 200 ML/MIN (ref 90–?)
GFR SERPL CREATININE-BSD FRML MDRD: 200 ML/MIN (ref 90–?)
GLUCOSE SERPL-MCNC: 82 MG/DL (ref 74–106)
GLUCOSE SERPL-MCNC: 87 MG/DL (ref 74–106)
HCT VFR BLD AUTO: 26.7 % (ref 36–48)
HGB BLD-MCNC: 8.6 G/DL (ref 12–16)
LYMPHOCYTES # BLD AUTO: 1.2 K/UL (ref 2.5–16.5)
LYMPHOCYTES NFR BLD AUTO: 13.2 % (ref 20.5–51.1)
MCH RBC QN AUTO: 28 PG (ref 27–31)
MCHC RBC AUTO-ENTMCNC: 32 G/DL (ref 33–37)
MCV RBC AUTO: 86 FL (ref 80–94)
MONOCYTES # BLD AUTO: 0.4 K/UL (ref 0.8–1)
MONOCYTES NFR BLD AUTO: 4.7 % (ref 1.7–9.3)
NEUTROPHILS # BLD AUTO: 7.6 K/UL (ref 1.8–7.7)
NEUTROPHILS NFR BLD AUTO: 80.9 % (ref 42.2–75.2)
PLATELET # BLD AUTO: 253 K/UL (ref 140–450)
POTASSIUM SERPL-SCNC: 2.7 MMOL/L (ref 3.5–5.1)
POTASSIUM SERPL-SCNC: 2.9 MMOL/L (ref 3.5–5.1)
RBC # BLD AUTO: 3.12 MIL/UL (ref 4.2–5.4)
SODIUM SERPL-SCNC: 135 MMOL/L (ref 136–145)
SODIUM SERPL-SCNC: 135 MMOL/L (ref 136–145)
WBC # BLD AUTO: 9.3 K/UL (ref 4.8–10.8)

## 2017-12-28 RX ADMIN — Medication SCH DEV: at 12:17

## 2017-12-28 RX ADMIN — DOCUSATE SODIUM SCH MG: 100 CAPSULE, LIQUID FILLED ORAL at 21:00

## 2017-12-28 RX ADMIN — DOCUSATE SODIUM SCH MG: 100 CAPSULE, LIQUID FILLED ORAL at 09:42

## 2017-12-28 RX ADMIN — Medication SCH DEV: at 21:00

## 2017-12-28 RX ADMIN — Medication SCH DEV: at 06:07

## 2017-12-28 RX ADMIN — PROPYLTHIOURACIL SCH MG: 50 TABLET ORAL at 05:00

## 2017-12-28 RX ADMIN — SODIUM CHLORIDE SCH MLS/HR: 9 INJECTION, SOLUTION INTRAVENOUS at 22:57

## 2017-12-28 RX ADMIN — PYRIDOXINE HCL TAB 50 MG SCH MG: 50 TAB at 09:42

## 2017-12-28 RX ADMIN — PROMETHAZINE HYDROCHLORIDE PRN MG: 25 INJECTION INTRAMUSCULAR; INTRAVENOUS at 12:16

## 2017-12-28 RX ADMIN — Medication SCH DEV: at 16:18

## 2017-12-28 RX ADMIN — SODIUM CHLORIDE PRN MG: 9 INJECTION, SOLUTION INTRAVENOUS at 17:59

## 2017-12-28 RX ADMIN — SODIUM CHLORIDE SCH MLS/HR: 9 INJECTION, SOLUTION INTRAVENOUS at 13:49

## 2017-12-28 RX ADMIN — PROPYLTHIOURACIL SCH MG: 50 TABLET ORAL at 21:00

## 2017-12-28 RX ADMIN — PROMETHAZINE HYDROCHLORIDE PRN MG: 25 INJECTION INTRAMUSCULAR; INTRAVENOUS at 20:15

## 2017-12-28 RX ADMIN — Medication SCH MG: at 21:00

## 2017-12-28 RX ADMIN — SODIUM CHLORIDE PRN MG: 9 INJECTION, SOLUTION INTRAVENOUS at 09:42

## 2017-12-28 RX ADMIN — SODIUM CHLORIDE PRN MG: 9 INJECTION, SOLUTION INTRAVENOUS at 03:30

## 2017-12-28 RX ADMIN — Medication SCH MG: at 09:00

## 2017-12-28 RX ADMIN — PROPYLTHIOURACIL SCH MG: 50 TABLET ORAL at 13:05

## 2017-12-28 NOTE — NUR
RECEIVED HANDOFF REPORT FROM NYASIA COELHO FOR CONTINUITY OF CARE. PT IS IN STABLE CONDITION. NO 
S/S OF DISTRESS NOTED. PT IS AAOX4 ON ROOM AIR. IV TO R HAND 22G PATENT AND INTACT, INFUSING 
WELL. RESPIRATIONS ARE EVEN AND UNLABORED. SKIN IS WARM AND DRY TO TOUCH. SKIN IS INTACT. 
INITIAL ASSESSMENT COMPLETED. PLAN OF CARE DISCUSSED WITH PT AT THE BEDSIDE. ALL SAFETY 
PRECAUTIONS MET, CALL LIGHT WITHIN REACH, WILL CONTINUE TO MONITOR

## 2017-12-28 NOTE — NUR
RECEIVED PT IN STABLE CONDITION FROM AM NURSE. AWAKE,ALERT AND ORIENTEDX4.  ON MED SURG. NO 
ACUTE DISTRESS NOTED. NO N/V AT  THIS TIME. WITH IVF K RIDER INFUSING WELL ON THE RT HAND 
#22. NO C/O PAIN NOTED. PLAN OF CARE DISCUSSED AND VERBALIZED UNDERSTANDING. BED ON LOW 
POSITION. CALL LIGHT PLACED WITHIN EASY REACH. WILL CONTINUE TO MONITOR.

## 2017-12-28 NOTE — NUR
PT VOMITED AGAIN. TALKED TO DR. MASCORRO. SHE SAID OK TO GIVE THE PHENERGAN NOW. WILL 
CONTINUE TO MONITOR.

## 2017-12-28 NOTE — NUR
RECEIVED SBAR REPORT FROM ALEKSANDRA RN AT PT BEDSIDE. PATIENT SLEEPING, EASILY AWAKENS. DENIES 
NAUSEA AT THIS TIME. DENIES PAIN/DISCOMFORT. ALERT AND ORIENTED. CALL LIGHT WITHIN REACH. IV 
SITE PATENT AND INTACT.

## 2017-12-28 NOTE — NUR
PATIENT MEDICATED FOR NAUSEA, TOLERATED PO MEDICATIONS. PATIENT RESTING, NO S/S OF ACUTE 
DISTRESS NOTED.

## 2017-12-28 NOTE — NUR
PT VOMITED AGAIN. MED NOT DUE YET. PAGED DR. MASCORRO AND MADE SOME CHANGES ON THE ORDER FOR 
ZOFRAN IV.

## 2017-12-28 NOTE — NUR
GAVE REPORT TO NIGHT NURSE BINU COELHO FOR CONTINUITY OF CARE, PT IN STABLE CONDITION. NO S/S 
OF DISTRESS

## 2017-12-29 VITALS — DIASTOLIC BLOOD PRESSURE: 79 MMHG | SYSTOLIC BLOOD PRESSURE: 121 MMHG

## 2017-12-29 VITALS — DIASTOLIC BLOOD PRESSURE: 81 MMHG | SYSTOLIC BLOOD PRESSURE: 122 MMHG

## 2017-12-29 VITALS — DIASTOLIC BLOOD PRESSURE: 60 MMHG | SYSTOLIC BLOOD PRESSURE: 100 MMHG

## 2017-12-29 VITALS — SYSTOLIC BLOOD PRESSURE: 134 MMHG | DIASTOLIC BLOOD PRESSURE: 85 MMHG

## 2017-12-29 LAB
ANION GAP SERPL CALCULATED.3IONS-SCNC: 16.6 MMOL/L (ref 8–16)
ANION GAP SERPL CALCULATED.3IONS-SCNC: 18.5 MMOL/L (ref 8–16)
BASOPHILS # BLD AUTO: 0.2 K/UL (ref 0–0.22)
BASOPHILS NFR BLD AUTO: 2.8 % (ref 0–2)
BUN SERPL-MCNC: 6 MG/DL (ref 7–18)
BUN SERPL-MCNC: 7 MG/DL (ref 7–18)
CHLORIDE SERPL-SCNC: 101 MMOL/L (ref 98–107)
CHLORIDE SERPL-SCNC: 102 MMOL/L (ref 98–107)
CO2 SERPL-SCNC: 20.2 MMOL/L (ref 21–32)
CO2 SERPL-SCNC: 20.3 MMOL/L (ref 21–32)
CREAT SERPL-MCNC: 0.5 MG/DL (ref 0.6–1.3)
CREAT SERPL-MCNC: 0.5 MG/DL (ref 0.6–1.3)
EOSINOPHIL # BLD AUTO: 0.1 K/UL (ref 0–0.4)
EOSINOPHIL NFR BLD AUTO: 0.8 % (ref 0–4)
ERYTHROCYTE [DISTWIDTH] IN BLOOD BY AUTOMATED COUNT: 14.4 % (ref 11.6–13.7)
GFR SERPL CREATININE-BSD FRML MDRD: 200 ML/MIN (ref 90–?)
GFR SERPL CREATININE-BSD FRML MDRD: 200 ML/MIN (ref 90–?)
GLUCOSE SERPL-MCNC: 79 MG/DL (ref 74–106)
GLUCOSE SERPL-MCNC: 82 MG/DL (ref 74–106)
HCT VFR BLD AUTO: 26 % (ref 36–48)
HGB BLD-MCNC: 8.8 G/DL (ref 12–16)
LYMPHOCYTES # BLD AUTO: 1.5 K/UL (ref 2.5–16.5)
LYMPHOCYTES NFR BLD AUTO: 20.3 % (ref 20.5–51.1)
MAGNESIUM SERPL-MCNC: 1.6 MG/DL (ref 1.8–2.4)
MCH RBC QN AUTO: 29 PG (ref 27–31)
MCHC RBC AUTO-ENTMCNC: 34 G/DL (ref 33–37)
MCV RBC AUTO: 85 FL (ref 80–94)
MONOCYTES # BLD AUTO: 0.3 K/UL (ref 0.8–1)
MONOCYTES NFR BLD AUTO: 4.6 % (ref 1.7–9.3)
NEUTROPHILS # BLD AUTO: 5.3 K/UL (ref 1.8–7.7)
NEUTROPHILS NFR BLD AUTO: 71.5 % (ref 42.2–75.2)
PHOSPHATE SERPL-MCNC: 2.4 MG/DL (ref 2.5–4.9)
PLATELET # BLD AUTO: 253 K/UL (ref 140–450)
POTASSIUM SERPL-SCNC: 2.9 MMOL/L (ref 3.5–5.1)
POTASSIUM SERPL-SCNC: 3.7 MMOL/L (ref 3.5–5.1)
RBC # BLD AUTO: 3.07 MIL/UL (ref 4.2–5.4)
SODIUM SERPL-SCNC: 135 MMOL/L (ref 136–145)
SODIUM SERPL-SCNC: 137 MMOL/L (ref 136–145)
WBC # BLD AUTO: 7.4 K/UL (ref 4.8–10.8)

## 2017-12-29 RX ADMIN — Medication SCH DEV: at 17:13

## 2017-12-29 RX ADMIN — Medication SCH MG: at 09:00

## 2017-12-29 RX ADMIN — SODIUM CHLORIDE PRN MG: 9 INJECTION, SOLUTION INTRAVENOUS at 02:01

## 2017-12-29 RX ADMIN — PYRIDOXINE HCL TAB 50 MG SCH MG: 50 TAB at 09:19

## 2017-12-29 RX ADMIN — SODIUM CHLORIDE PRN MG: 9 INJECTION, SOLUTION INTRAVENOUS at 18:02

## 2017-12-29 RX ADMIN — PROMETHAZINE HYDROCHLORIDE PRN MG: 25 INJECTION INTRAMUSCULAR; INTRAVENOUS at 11:18

## 2017-12-29 RX ADMIN — SODIUM CHLORIDE PRN MG: 9 INJECTION, SOLUTION INTRAVENOUS at 08:39

## 2017-12-29 RX ADMIN — Medication SCH DEV: at 07:41

## 2017-12-29 RX ADMIN — DOCUSATE SODIUM SCH MG: 100 CAPSULE, LIQUID FILLED ORAL at 09:00

## 2017-12-29 RX ADMIN — PROPYLTHIOURACIL SCH MG: 50 TABLET ORAL at 05:00

## 2017-12-29 RX ADMIN — PROMETHAZINE HYDROCHLORIDE PRN MG: 25 INJECTION INTRAMUSCULAR; INTRAVENOUS at 19:51

## 2017-12-29 RX ADMIN — Medication SCH DEV: at 12:25

## 2017-12-29 RX ADMIN — PROMETHAZINE HYDROCHLORIDE PRN MG: 25 INJECTION INTRAMUSCULAR; INTRAVENOUS at 04:45

## 2017-12-29 RX ADMIN — SODIUM CHLORIDE SCH MLS/HR: 9 INJECTION, SOLUTION INTRAVENOUS at 09:49

## 2017-12-29 NOTE — NUR
DR MASCORRO CAME AND SPOKE TO THE PATIENT. SEEN PT AND SHE AGREED TO GO HOME. PT'S MOTHER 
CALLED ASKING WHY PT IS BEING DC TONIGHT. INFORMED HER DR MASCORRO SPOKE TO THE PT ALREADY. 
SHE SAID, " I CAN'T COME TO PICK HER UP BECAUSE ITS TOO LATE ALREADY AND MY BABY AND PT'S 
BABY IS ALREADY ASLEEP AND MY  LEFT FOR [place]." ASKED FOR HER CONTACT NUMBER FOR 
CALL [phone]. WILL CALL DR MASCORRO.

## 2017-12-29 NOTE — NUR
SPOKE TO DR MASCORRO REGARDING PT DISCHARGE AND SHE SAID "YES, SHE'S BEING DISCHARGE 
TONIGHT." INFORMED HER PT DOESN'T FEEL GOOD AND STILL FEELS NAUSEOUS AND REQUESTING IF SHE 
CAN LEAVE TOMORROW MORNING. DR MASCORRO SAID "SHE'S DISCHARGE ALREADY." REQUEST DR MASCORRO IF 
SHE CAN COME AND TALK TO THE PATIENT. SHE AGREED TO COME AND SEE PATIENT.

## 2017-12-29 NOTE — NUR
SPOKE TO DR MASCORRO REGARDING PT'S TRANSPORTATION ISSUE. DR MASCORRO SAID "SHE NEEDS TO GO 
HOME," "SHE'S AN ADULT", SHE CAN TAKE UBER OR SOMETHING." GAVE PT'S MOTHER CONTACT NUMBER 
AND DR MASCORRO WILL GIVE PT'S MOTHER A CALL."

## 2017-12-29 NOTE — NUR
RECEIVED PT IN STABLE CONDITION FROM NIGHT SHIFT NURSE. PT IS AWAKE,ALERT AND ORIENTEDX4.  
ON MED SURG. NO ACUTE DISTRESS NOTED. PT HAS VOMITED IN THE VOMITING BAG. WILL ADMINISTER 
VOMITING MED WHEN DUE.  IV NOTED TO THE RIGHT HAND #22 INFUSING WELL. NO C/O PAIN NOTED. BED 
IN LOW POSITION. CALL LIGHT WITHIN REACH. WILL CONTINUE TO MONITOR.

## 2017-12-29 NOTE — NUR
REPORT RECEIVED FROM ALEKSANDRA RN.PT IS IN STABLE CONDITION.NO S/S OF N/V NOTED NOW.RESTING IN 
BED.WILL CONTINUE MONITORING.

## 2017-12-29 NOTE — NUR
Seen pt awake, alert and oriented sitting up in bed. Mother at bedside asking questions. 
Initial assessment done. Safety ensured.

## 2017-12-29 NOTE — NUR
Pt called complaining of feeling nauseous. Vital signs checked. Pt given Phenergan IVP as 
ordered. Pt denies any other needs. Call light w/in reach.

## 2018-01-09 ENCOUNTER — HOSPITAL ENCOUNTER (INPATIENT)
Dept: HOSPITAL 1 - ED | Age: 23
LOS: 2 days | Discharge: HOME | DRG: 781 | End: 2018-01-11
Attending: FAMILY MEDICINE | Admitting: FAMILY MEDICINE
Payer: COMMERCIAL

## 2018-01-09 VITALS
WEIGHT: 141.05 LBS | HEIGHT: 64 IN | BODY MASS INDEX: 24.08 KG/M2 | WEIGHT: 141.05 LBS | HEIGHT: 64 IN | BODY MASS INDEX: 24.08 KG/M2

## 2018-01-09 DIAGNOSIS — R65.10: ICD-10-CM

## 2018-01-09 DIAGNOSIS — O21.1: Primary | ICD-10-CM

## 2018-01-09 DIAGNOSIS — N17.0: ICD-10-CM

## 2018-01-09 DIAGNOSIS — Z3A.16: ICD-10-CM

## 2018-01-10 VITALS — SYSTOLIC BLOOD PRESSURE: 122 MMHG | DIASTOLIC BLOOD PRESSURE: 90 MMHG

## 2018-01-10 VITALS — SYSTOLIC BLOOD PRESSURE: 116 MMHG | DIASTOLIC BLOOD PRESSURE: 68 MMHG

## 2018-01-10 VITALS — DIASTOLIC BLOOD PRESSURE: 69 MMHG | SYSTOLIC BLOOD PRESSURE: 137 MMHG

## 2018-01-10 VITALS — DIASTOLIC BLOOD PRESSURE: 83 MMHG | SYSTOLIC BLOOD PRESSURE: 121 MMHG

## 2018-01-10 VITALS — SYSTOLIC BLOOD PRESSURE: 118 MMHG | DIASTOLIC BLOOD PRESSURE: 74 MMHG

## 2018-01-10 VITALS — DIASTOLIC BLOOD PRESSURE: 79 MMHG | SYSTOLIC BLOOD PRESSURE: 122 MMHG

## 2018-01-10 LAB
AMYLASE SERPL-CCNC: 123 U/L (ref 25–115)
BASOPHILS NFR BLD: 0.4 % (ref 0–2)
BASOPHILS NFR BLD: 1.8 % (ref 0–2)
BASOPHILS NFR BLD: 2 % (ref 0–2)
BUN SERPL-MCNC: 21 MG/DL (ref 7–18)
BUN SERPL-MCNC: 23 MG/DL (ref 7–18)
BUN SERPL-MCNC: 28 MG/DL (ref 7–18)
BUN SERPL-MCNC: 31 MG/DL (ref 7–18)
CALCIUM SERPL-MCNC: 10 MG/DL (ref 8.5–10.1)
CALCIUM SERPL-MCNC: 8.3 MG/DL (ref 8.5–10.1)
CALCIUM SERPL-MCNC: 8.5 MG/DL (ref 8.5–10.1)
CALCIUM SERPL-MCNC: 8.6 MG/DL (ref 8.5–10.1)
CHLORIDE SERPL-SCNC: 74 MMOL/L (ref 98–107)
CHLORIDE SERPL-SCNC: 85 MMOL/L (ref 98–107)
CHLORIDE SERPL-SCNC: 85 MMOL/L (ref 98–107)
CHLORIDE SERPL-SCNC: 88 MMOL/L (ref 98–107)
CHOLEST SERPL-MCNC: 393 MG/DL (ref ?–200)
CHOLEST/HDLC SERPL: 7 MG/DL
CO2 SERPL-SCNC: 35.5 MMOL/L (ref 21–32)
CO2 SERPL-SCNC: 36.2 MMOL/L (ref 21–32)
CO2 SERPL-SCNC: 37.1 MMOL/L (ref 21–32)
CO2 SERPL-SCNC: 42.5 MMOL/L (ref 21–32)
CREAT SERPL-MCNC: 1.5 MG/DL (ref 0.6–1)
CREAT SERPL-MCNC: 1.6 MG/DL (ref 0.6–1)
CREAT SERPL-MCNC: 2.1 MG/DL (ref 0.6–1)
CREAT SERPL-MCNC: 2.8 MG/DL (ref 0.6–1)
ERYTHROCYTE [DISTWIDTH] IN BLOOD BY AUTOMATED COUNT: 14.3 % (ref 11.5–14.5)
ERYTHROCYTE [DISTWIDTH] IN BLOOD BY AUTOMATED COUNT: 14.6 % (ref 11.5–14.5)
ERYTHROCYTE [DISTWIDTH] IN BLOOD BY AUTOMATED COUNT: 15.3 % (ref 11.5–14.5)
GFR SERPLBLD BASED ON 1.73 SQ M-ARVRAT: 23 ML/MIN
GFR SERPLBLD BASED ON 1.73 SQ M-ARVRAT: 32 ML/MIN
GFR SERPLBLD BASED ON 1.73 SQ M-ARVRAT: 43 ML/MIN
GFR SERPLBLD BASED ON 1.73 SQ M-ARVRAT: 47 ML/MIN
GLUCOSE SERPL-MCNC: 114 MG/DL (ref 74–106)
GLUCOSE SERPL-MCNC: 114 MG/DL (ref 74–106)
GLUCOSE SERPL-MCNC: 120 MG/DL (ref 74–106)
GLUCOSE SERPL-MCNC: 95 MG/DL (ref 74–106)
HDLC SERPL-MCNC: 56 MG/DL (ref 40–60)
LIPASE SERPL-CCNC: 155 IU/L (ref 73–393)
MAGNESIUM SERPL-MCNC: 2.2 MG/DL (ref 1.8–2.4)
PHOSPHATE SERPL-MCNC: 4.9 MG/DL (ref 2.5–4.9)
PLATELET # BLD: 285 X10^3MCL (ref 130–400)
PLATELET # BLD: 321 X10^3MCL (ref 130–400)
PLATELET # BLD: 432 X10^3MCL (ref 130–400)
POTASSIUM SERPL-SCNC: 1.8 MMOL/L (ref 3.5–5.1)
POTASSIUM SERPL-SCNC: 1.8 MMOL/L (ref 3.5–5.1)
POTASSIUM SERPL-SCNC: 1.9 MMOL/L (ref 3.5–5.1)
POTASSIUM SERPL-SCNC: 2.3 MMOL/L (ref 3.5–5.1)
SODIUM SERPL-SCNC: 129 MMOL/L (ref 136–145)
SODIUM SERPL-SCNC: 130 MMOL/L (ref 136–145)
SODIUM SERPL-SCNC: 130 MMOL/L (ref 136–145)
SODIUM SERPL-SCNC: 132 MMOL/L (ref 136–145)
T3 SERPL-MCNC: 4.18 NG/ML
T3RU NFR SERPL: 26 % UPTAKE (ref 30–39)
T4 FREE SERPL-MCNC: 1.7 NG/DL (ref 0.76–1.46)
T4 SERPL-MCNC: 32.8 UG/DL (ref 4.7–13.3)
T4/T3 UPTAKE INDEX SERPL: 8.5 UG/DL (ref 1.4–4.5)
TRIGL SERPL-MCNC: 570 MG/DL (ref ?–150)

## 2018-01-11 VITALS — DIASTOLIC BLOOD PRESSURE: 72 MMHG | SYSTOLIC BLOOD PRESSURE: 110 MMHG

## 2018-01-11 VITALS — DIASTOLIC BLOOD PRESSURE: 60 MMHG | SYSTOLIC BLOOD PRESSURE: 111 MMHG

## 2018-01-11 VITALS — SYSTOLIC BLOOD PRESSURE: 110 MMHG | DIASTOLIC BLOOD PRESSURE: 72 MMHG

## 2018-01-11 VITALS — DIASTOLIC BLOOD PRESSURE: 72 MMHG | SYSTOLIC BLOOD PRESSURE: 119 MMHG

## 2018-01-11 LAB
BASOPHILS NFR BLD: 0.4 % (ref 0–2)
BUN SERPL-MCNC: 14 MG/DL (ref 7–18)
BUN SERPL-MCNC: 23 MG/DL (ref 7–18)
CALCIUM SERPL-MCNC: 8.1 MG/DL (ref 8.5–10.1)
CALCIUM SERPL-MCNC: 8.5 MG/DL (ref 8.5–10.1)
CHLORIDE SERPL-SCNC: 95 MMOL/L (ref 98–107)
CHLORIDE SERPL-SCNC: 98 MMOL/L (ref 98–107)
CO2 SERPL-SCNC: 30.9 MMOL/L (ref 21–32)
CO2 SERPL-SCNC: 32.7 MMOL/L (ref 21–32)
CREAT SERPL-MCNC: 0.9 MG/DL (ref 0.6–1)
CREAT SERPL-MCNC: 1.1 MG/DL (ref 0.6–1)
ERYTHROCYTE [DISTWIDTH] IN BLOOD BY AUTOMATED COUNT: 15.9 % (ref 11.5–14.5)
GFR SERPLBLD BASED ON 1.73 SQ M-ARVRAT: > 60 ML/MIN
GFR SERPLBLD BASED ON 1.73 SQ M-ARVRAT: > 60 ML/MIN
GLUCOSE SERPL-MCNC: 92 MG/DL (ref 74–106)
GLUCOSE SERPL-MCNC: 97 MG/DL (ref 74–106)
MAGNESIUM SERPL-MCNC: 1.6 MG/DL (ref 1.8–2.4)
PHOSPHATE SERPL-MCNC: 2 MG/DL (ref 2.5–4.9)
PLATELET # BLD: 238 X10^3MCL (ref 130–400)
POTASSIUM SERPL-SCNC: 3 MMOL/L (ref 3.5–5.1)
POTASSIUM SERPL-SCNC: 3.7 MMOL/L (ref 3.5–5.1)
SODIUM SERPL-SCNC: 134 MMOL/L (ref 136–145)
SODIUM SERPL-SCNC: 135 MMOL/L (ref 136–145)

## 2018-01-22 ENCOUNTER — HOSPITAL ENCOUNTER (OUTPATIENT)
Dept: HOSPITAL 1 - ED | Age: 23
Setting detail: OBSERVATION
LOS: 1 days | Discharge: HOME | DRG: 781 | End: 2018-01-23
Attending: FAMILY MEDICINE | Admitting: FAMILY MEDICINE
Payer: COMMERCIAL

## 2018-01-22 VITALS
WEIGHT: 161.31 LBS | BODY MASS INDEX: 27.54 KG/M2 | HEIGHT: 64 IN | HEIGHT: 64 IN | BODY MASS INDEX: 27.54 KG/M2 | WEIGHT: 161.31 LBS

## 2018-01-22 DIAGNOSIS — Z3A.19: ICD-10-CM

## 2018-01-22 DIAGNOSIS — D64.9: ICD-10-CM

## 2018-01-22 DIAGNOSIS — E78.5: ICD-10-CM

## 2018-01-22 DIAGNOSIS — O99.012: ICD-10-CM

## 2018-01-22 DIAGNOSIS — E83.42: ICD-10-CM

## 2018-01-22 DIAGNOSIS — O21.1: Primary | ICD-10-CM

## 2018-01-22 DIAGNOSIS — O23.42: ICD-10-CM

## 2018-01-22 LAB
ALBUMIN SERPL-MCNC: 3 G/DL (ref 3.4–5)
ALP SERPL-CCNC: 50 U/L (ref 46–116)
ALT SERPL-CCNC: 20 U/L (ref 14–59)
AST SERPL-CCNC: 16 U/L (ref 15–37)
BASOPHILS NFR BLD: 0.5 % (ref 0–2)
BILIRUB SERPL-MCNC: 0.54 MG/DL (ref 0.2–1)
BUN SERPL-MCNC: 8 MG/DL (ref 7–18)
CALCIUM SERPL-MCNC: 9 MG/DL (ref 8.5–10.1)
CHLORIDE SERPL-SCNC: 102 MMOL/L (ref 98–107)
CO2 SERPL-SCNC: 23.6 MMOL/L (ref 21–32)
CREAT SERPL-MCNC: 0.6 MG/DL (ref 0.6–1)
ERYTHROCYTE [DISTWIDTH] IN BLOOD BY AUTOMATED COUNT: 18.3 % (ref 11.5–14.5)
GFR SERPLBLD BASED ON 1.73 SQ M-ARVRAT: > 60 ML/MIN
GLUCOSE SERPL-MCNC: 86 MG/DL (ref 74–106)
LIPASE SERPL-CCNC: 97 IU/L (ref 73–393)
MICROSCOPIC UR-IMP: YES
PLATELET # BLD: 340 X10^3MCL (ref 130–400)
POTASSIUM SERPL-SCNC: 2.8 MMOL/L (ref 3.5–5.1)
PROT SERPL-MCNC: 6.8 G/DL (ref 6.4–8.2)
RBC # UR STRIP.AUTO: NEGATIVE /UL
SODIUM SERPL-SCNC: 139 MMOL/L (ref 136–145)
UA SPECIFIC GRAVITY: 1.02 (ref 1–1.03)

## 2018-01-22 PROCEDURE — G0378 HOSPITAL OBSERVATION PER HR: HCPCS

## 2018-01-23 VITALS — DIASTOLIC BLOOD PRESSURE: 71 MMHG | SYSTOLIC BLOOD PRESSURE: 117 MMHG

## 2018-01-23 VITALS — SYSTOLIC BLOOD PRESSURE: 101 MMHG | DIASTOLIC BLOOD PRESSURE: 53 MMHG

## 2018-01-23 VITALS — DIASTOLIC BLOOD PRESSURE: 59 MMHG | SYSTOLIC BLOOD PRESSURE: 111 MMHG

## 2018-01-23 VITALS — SYSTOLIC BLOOD PRESSURE: 97 MMHG | DIASTOLIC BLOOD PRESSURE: 60 MMHG

## 2018-01-23 VITALS — DIASTOLIC BLOOD PRESSURE: 87 MMHG | SYSTOLIC BLOOD PRESSURE: 126 MMHG

## 2018-01-23 VITALS — SYSTOLIC BLOOD PRESSURE: 111 MMHG | DIASTOLIC BLOOD PRESSURE: 59 MMHG

## 2018-01-23 LAB
AMPHETAMINES UR QL SCN: (no result)
AMYLASE SERPL-CCNC: 99 U/L (ref 25–115)
BASOPHILS NFR BLD: 0.6 % (ref 0–2)
BUN SERPL-MCNC: 5 MG/DL (ref 7–18)
CALCIUM SERPL-MCNC: 8.1 MG/DL (ref 8.5–10.1)
CHLORIDE SERPL-SCNC: 105 MMOL/L (ref 98–107)
CHOLEST SERPL-MCNC: 334 MG/DL (ref ?–200)
CHOLEST/HDLC SERPL: 4 MG/DL
CO2 SERPL-SCNC: 21.9 MMOL/L (ref 21–32)
CREAT SERPL-MCNC: 0.5 MG/DL (ref 0.6–1)
ERYTHROCYTE [DISTWIDTH] IN BLOOD BY AUTOMATED COUNT: 18.1 % (ref 11.5–14.5)
GFR SERPLBLD BASED ON 1.73 SQ M-ARVRAT: > 60 ML/MIN
GLUCOSE SERPL-MCNC: 70 MG/DL (ref 74–106)
HDLC SERPL-MCNC: 84 MG/DL (ref 40–60)
IRON SERPL-MCNC: 74 UG/DL (ref 50–170)
MAGNESIUM SERPL-MCNC: 1.7 MG/DL (ref 1.8–2.4)
MAGNESIUM SERPL-MCNC: 1.8 MG/DL (ref 1.8–2.4)
PHOSPHATE SERPL-MCNC: 2.9 MG/DL (ref 2.5–4.9)
PHOSPHATE SERPL-MCNC: 3.7 MG/DL (ref 2.5–4.9)
PLATELET # BLD: 304 X10^3MCL (ref 130–400)
POTASSIUM SERPL-SCNC: 3.5 MMOL/L (ref 3.5–5.1)
RBC # BLD AUTO: 2.36 M/MM3 (ref 4.1–5.1)
SODIUM SERPL-SCNC: 139 MMOL/L (ref 136–145)
T3 SERPL-MCNC: 2.12 NG/ML
T3RU NFR SERPL: 19 % UPTAKE (ref 30–39)
T4 FREE SERPL-MCNC: 0.78 NG/DL (ref 0.76–1.46)
T4 SERPL-MCNC: 12.6 UG/DL (ref 4.7–13.3)
T4/T3 UPTAKE INDEX SERPL: 2.4 UG/DL (ref 1.4–4.5)
TIBC SERPL-MCNC: 292 UG/DL (ref 250–450)
TRIGL SERPL-MCNC: 304 MG/DL (ref ?–150)

## 2018-03-07 ENCOUNTER — HOSPITAL ENCOUNTER (EMERGENCY)
Dept: HOSPITAL 1 - ED | Age: 23
LOS: 1 days | Discharge: HOME | End: 2018-03-08
Payer: COMMERCIAL

## 2018-03-07 VITALS — HEIGHT: 62.01 IN | BODY MASS INDEX: 26.71 KG/M2 | WEIGHT: 146.98 LBS

## 2018-03-07 VITALS — DIASTOLIC BLOOD PRESSURE: 74 MMHG | SYSTOLIC BLOOD PRESSURE: 129 MMHG

## 2018-03-07 DIAGNOSIS — O21.1: Primary | ICD-10-CM

## 2018-03-07 DIAGNOSIS — Z3A.01: ICD-10-CM

## 2018-03-07 LAB
ALBUMIN SERPL-MCNC: 3.3 G/DL (ref 3.4–5)
ALP SERPL-CCNC: 79 U/L (ref 46–116)
ALT SERPL-CCNC: 26 U/L (ref 14–59)
AMYLASE SERPL-CCNC: 135 U/L (ref 25–115)
AST SERPL-CCNC: 23 U/L (ref 15–37)
BASOPHILS NFR BLD: 0.2 % (ref 0–2)
BILIRUB SERPL-MCNC: 0.5 MG/DL (ref 0.2–1)
BUN SERPL-MCNC: 31 MG/DL (ref 7–18)
CALCIUM SERPL-MCNC: 9.2 MG/DL (ref 8.5–10.1)
CHLORIDE SERPL-SCNC: 94 MMOL/L (ref 98–107)
CO2 SERPL-SCNC: 34.5 MMOL/L (ref 21–32)
CREAT SERPL-MCNC: 1.9 MG/DL (ref 0.6–1)
ERYTHROCYTE [DISTWIDTH] IN BLOOD BY AUTOMATED COUNT: 13.6 % (ref 11.5–14.5)
GFR SERPLBLD BASED ON 1.73 SQ M-ARVRAT: 35 ML/MIN
GLUCOSE SERPL-MCNC: 101 MG/DL (ref 74–106)
LIPASE SERPL-CCNC: 177 IU/L (ref 73–393)
PLATELET # BLD: 312 X10^3MCL (ref 130–400)
POTASSIUM SERPL-SCNC: 2.4 MMOL/L (ref 3.5–5.1)
PROT SERPL-MCNC: 7.5 G/DL (ref 6.4–8.2)
SODIUM SERPL-SCNC: 140 MMOL/L (ref 136–145)

## 2018-06-07 ENCOUNTER — HOSPITAL ENCOUNTER (OUTPATIENT)
Dept: HOSPITAL 26 - MLD | Age: 23
Setting detail: OBSERVATION
Discharge: HOME | End: 2018-06-07
Attending: OBSTETRICS & GYNECOLOGY | Admitting: OBSTETRICS & GYNECOLOGY
Payer: COMMERCIAL

## 2018-06-07 VITALS — DIASTOLIC BLOOD PRESSURE: 63 MMHG | SYSTOLIC BLOOD PRESSURE: 108 MMHG

## 2018-06-07 VITALS — BODY MASS INDEX: 30.9 KG/M2 | HEIGHT: 64 IN | WEIGHT: 181 LBS

## 2018-06-07 DIAGNOSIS — Z3A.38: ICD-10-CM

## 2018-06-07 DIAGNOSIS — R10.2: ICD-10-CM

## 2018-06-07 DIAGNOSIS — O26.893: Primary | ICD-10-CM

## 2018-06-07 PROCEDURE — 76819 FETAL BIOPHYS PROFIL W/O NST: CPT

## 2018-06-07 PROCEDURE — 81000 URINALYSIS NONAUTO W/SCOPE: CPT

## 2018-06-07 PROCEDURE — G0378 HOSPITAL OBSERVATION PER HR: HCPCS

## 2018-06-07 PROCEDURE — 59025 FETAL NON-STRESS TEST: CPT

## 2018-06-08 ENCOUNTER — HOSPITAL ENCOUNTER (INPATIENT)
Dept: HOSPITAL 26 - MLD | Age: 23
LOS: 2 days | Discharge: HOME | End: 2018-06-10
Attending: OBSTETRICS & GYNECOLOGY | Admitting: OBSTETRICS & GYNECOLOGY
Payer: COMMERCIAL

## 2018-06-08 VITALS — HEIGHT: 63 IN | WEIGHT: 165 LBS | BODY MASS INDEX: 29.23 KG/M2

## 2018-06-08 VITALS — DIASTOLIC BLOOD PRESSURE: 60 MMHG | SYSTOLIC BLOOD PRESSURE: 105 MMHG

## 2018-06-08 DIAGNOSIS — Z3A.38: ICD-10-CM

## 2018-06-08 DIAGNOSIS — Z23: ICD-10-CM

## 2018-06-08 PROCEDURE — G0378 HOSPITAL OBSERVATION PER HR: HCPCS

## 2018-06-09 LAB
ALBUMIN FLD-MCNC: 2.2 G/DL (ref 3.4–5)
ANION GAP SERPL CALCULATED.3IONS-SCNC: 14 MMOL/L (ref 8–16)
APPEARANCE UR: (no result)
AST SERPL-CCNC: 10 U/L (ref 15–37)
BARBITURATES UR QL SCN: (no result) NG/ML
BASOPHILS # BLD AUTO: 0.1 K/UL (ref 0–0.22)
BASOPHILS NFR BLD AUTO: 1.1 % (ref 0–2)
BENZODIAZ UR QL SCN: (no result) NG/ML
BILIRUB SERPL-MCNC: 0.2 MG/DL (ref 0–1)
BILIRUB UR QL STRIP: NEGATIVE
BUN SERPL-MCNC: 10 MG/DL (ref 7–18)
BZE UR QL SCN: (no result) NG/ML
CANNABINOIDS UR QL SCN: (no result) NG/ML
CHLORIDE SERPL-SCNC: 105 MMOL/L (ref 98–107)
CO2 SERPL-SCNC: 21.9 MMOL/L (ref 21–32)
COLOR UR: YELLOW
CREAT SERPL-MCNC: 0.7 MG/DL (ref 0.6–1.3)
EOSINOPHIL # BLD AUTO: 0.1 K/UL (ref 0–0.4)
EOSINOPHIL NFR BLD AUTO: 0.7 % (ref 0–4)
ERYTHROCYTE [DISTWIDTH] IN BLOOD BY AUTOMATED COUNT: 14.1 % (ref 11.6–13.7)
GFR SERPL CREATININE-BSD FRML MDRD: 136 ML/MIN (ref 90–?)
GLUCOSE SERPL-MCNC: 85 MG/DL (ref 74–106)
GLUCOSE UR STRIP-MCNC: NEGATIVE MG/DL
HCT VFR BLD AUTO: 25.9 % (ref 36–48)
HGB BLD-MCNC: 8.5 G/DL (ref 12–16)
HGB UR QL STRIP: NEGATIVE
LEUKOCYTE ESTERASE UR QL STRIP: (no result)
LYMPHOCYTES # BLD AUTO: 2.4 K/UL (ref 2.5–16.5)
LYMPHOCYTES NFR BLD AUTO: 34.9 % (ref 20.5–51.1)
MCH RBC QN AUTO: 27 PG (ref 27–31)
MCHC RBC AUTO-ENTMCNC: 33 G/DL (ref 33–37)
MCV RBC AUTO: 82.4 FL (ref 80–94)
MONOCYTES # BLD AUTO: 0.4 K/UL (ref 0.8–1)
MONOCYTES NFR BLD AUTO: 6.1 % (ref 1.7–9.3)
NEUTROPHILS # BLD AUTO: 3.9 K/UL (ref 1.8–7.7)
NEUTROPHILS NFR BLD AUTO: 57.2 % (ref 42.2–75.2)
NITRITE UR QL STRIP: NEGATIVE
OPIATES UR QL SCN: (no result) NG/ML
PCP UR QL SCN: (no result) NG/ML
PH UR STRIP: 7 [PH] (ref 5–9)
PLATELET # BLD AUTO: 236 K/UL (ref 140–450)
POTASSIUM SERPL-SCNC: 3.9 MMOL/L (ref 3.5–5.1)
RBC # BLD AUTO: 3.14 MIL/UL (ref 4.2–5.4)
RBC #/AREA URNS HPF: (no result) /HPF (ref 0–5)
SODIUM SERPL-SCNC: 137 MMOL/L (ref 136–145)
WBC # BLD AUTO: 6.9 K/UL (ref 4.8–10.8)
WBC,URINE: (no result) /HPF (ref 0–5)

## 2018-06-09 PROCEDURE — 10907ZC DRAINAGE OF AMNIOTIC FLUID, THERAPEUTIC FROM PRODUCTS OF CONCEPTION, VIA NATURAL OR ARTIFICIAL OPENING: ICD-10-PCS | Performed by: OBSTETRICS & GYNECOLOGY

## 2018-06-09 RX ADMIN — HYDROCODONE BITARTRATE AND ACETAMINOPHEN PRN TAB: 5; 325 TABLET ORAL at 14:43

## 2018-06-09 RX ADMIN — HYDROCODONE BITARTRATE AND ACETAMINOPHEN PRN TAB: 5; 325 TABLET ORAL at 18:42

## 2018-06-09 NOTE — NUR
PATIENT HAS BEEN SCREENED AND CATEGORIZED AS LOW NUTRITION RISK. PATIENT WILL BE SEEN WITHIN 
7 DAYS OF ADMISSION.



06/15/18



BARTOLO NAILS MBA, RD

## 2018-06-10 LAB
HCT VFR BLD AUTO: 22.8 % (ref 36–48)
HGB BLD-MCNC: 7.4 G/DL (ref 12–16)

## 2018-06-10 PROCEDURE — 3E0234Z INTRODUCTION OF SERUM, TOXOID AND VACCINE INTO MUSCLE, PERCUTANEOUS APPROACH: ICD-10-PCS | Performed by: OBSTETRICS & GYNECOLOGY

## 2018-06-10 RX ADMIN — HYDROCODONE BITARTRATE AND ACETAMINOPHEN PRN TAB: 5; 325 TABLET ORAL at 01:19

## 2019-11-12 ENCOUNTER — HOSPITAL ENCOUNTER (EMERGENCY)
Dept: HOSPITAL 26 - MED | Age: 24
Discharge: HOME | End: 2019-11-12
Payer: COMMERCIAL

## 2019-11-12 VITALS — WEIGHT: 187 LBS | BODY MASS INDEX: 31.92 KG/M2 | HEIGHT: 64 IN

## 2019-11-12 VITALS — SYSTOLIC BLOOD PRESSURE: 124 MMHG | DIASTOLIC BLOOD PRESSURE: 73 MMHG

## 2019-11-12 VITALS — SYSTOLIC BLOOD PRESSURE: 134 MMHG | DIASTOLIC BLOOD PRESSURE: 57 MMHG

## 2019-11-12 DIAGNOSIS — Z79.1: ICD-10-CM

## 2019-11-12 DIAGNOSIS — Z11.3: ICD-10-CM

## 2019-11-12 DIAGNOSIS — N39.0: Primary | ICD-10-CM

## 2019-11-12 DIAGNOSIS — Z79.899: ICD-10-CM

## 2019-11-12 LAB
APPEARANCE UR: (no result)
BILIRUB UR QL STRIP: NEGATIVE
COLOR UR: YELLOW
GLUCOSE UR STRIP-MCNC: NEGATIVE MG/DL
HGB UR QL STRIP: (no result)
LEUKOCYTE ESTERASE UR QL STRIP: (no result)
NITRITE UR QL STRIP: NEGATIVE
PH UR STRIP: 6 [PH] (ref 5–9)
RBC #/AREA URNS HPF: (no result) /HPF (ref 0–5)
WBC,URINE: (no result) /HPF (ref 0–5)

## 2019-11-12 PROCEDURE — 81025 URINE PREGNANCY TEST: CPT

## 2019-11-12 PROCEDURE — 81001 URINALYSIS AUTO W/SCOPE: CPT

## 2019-11-12 PROCEDURE — 87205 SMEAR GRAM STAIN: CPT

## 2019-11-12 PROCEDURE — 87210 SMEAR WET MOUNT SALINE/INK: CPT

## 2019-11-12 PROCEDURE — 87086 URINE CULTURE/COLONY COUNT: CPT

## 2019-11-12 PROCEDURE — 96372 THER/PROPH/DIAG INJ SC/IM: CPT

## 2019-11-12 PROCEDURE — 99283 EMERGENCY DEPT VISIT LOW MDM: CPT

## 2019-11-12 PROCEDURE — 87070 CULTURE OTHR SPECIMN AEROBIC: CPT

## 2019-11-12 PROCEDURE — 36415 COLL VENOUS BLD VENIPUNCTURE: CPT

## 2019-11-12 NOTE — NUR
Patient states she has had burning when she urinates as well as seeing blood on 
the tissue when she wipes after urinating. She states the pain when she 
urniates is a 8/10 and feels better after voiding. She also states she has had 
pain after intercourse with cramping that lasts for about 60 minutes afterwards 
with spotting of blood. The patient has an old RX of Macrobid 100mg at home, 
and took one table today because she thought she may have a urniary tract 
infection. The patient denies any fever, chills. She has had her IUD in for one 
year.

## 2021-02-12 NOTE — NUR
PT REFUSED TO BE WHEELED OUT AND SAID "MY MOM IS OUTSIDE WAITING." PT LEFT WALKING 
ACCOMPANIED BY GREY S[place]AMARIA. Breath sounds clear and equal bilaterally.